# Patient Record
Sex: MALE | Race: WHITE | Employment: OTHER | URBAN - NONMETROPOLITAN AREA
[De-identification: names, ages, dates, MRNs, and addresses within clinical notes are randomized per-mention and may not be internally consistent; named-entity substitution may affect disease eponyms.]

---

## 2017-08-23 ENCOUNTER — APPOINTMENT (OUTPATIENT)
Dept: INTERVENTIONAL RADIOLOGY/VASCULAR | Age: 67
End: 2017-08-23
Payer: MEDICARE

## 2017-08-23 ENCOUNTER — APPOINTMENT (OUTPATIENT)
Dept: CT IMAGING | Age: 67
End: 2017-08-23
Payer: MEDICARE

## 2017-08-23 ENCOUNTER — HOSPITAL ENCOUNTER (EMERGENCY)
Age: 67
Discharge: HOME | End: 2017-08-24
Attending: EMERGENCY MEDICINE
Payer: MEDICARE

## 2017-08-23 DIAGNOSIS — M10.9 ACUTE GOUT OF LEFT KNEE, UNSPECIFIED CAUSE: Primary | ICD-10-CM

## 2017-08-23 LAB
ANION GAP SERPL CALCULATED.3IONS-SCNC: 19 MEQ/L (ref 8–16)
ANISOCYTOSIS: ABNORMAL
BACTERIA: ABNORMAL /HPF
BASOPHILS # BLD: 0.7 %
BASOPHILS ABSOLUTE: 0.1 THOU/MM3 (ref 0–0.1)
BILIRUBIN URINE: ABNORMAL
BLOOD, URINE: NEGATIVE
BUN BLDV-MCNC: 15 MG/DL (ref 7–22)
CALCIUM SERPL-MCNC: 9.7 MG/DL (ref 8.5–10.5)
CASTS 2: ABNORMAL /LPF
CASTS UA: ABNORMAL /LPF
CHARACTER, URINE: CLEAR
CHLORIDE BLD-SCNC: 94 MEQ/L (ref 98–111)
CO2: 24 MEQ/L (ref 23–33)
COLOR: ABNORMAL
CREAT SERPL-MCNC: 1.1 MG/DL (ref 0.4–1.2)
CRYSTALS, UA: ABNORMAL
D-DIMER QUANTITATIVE: 1735 NG/ML FEU (ref 0–500)
EOSINOPHIL # BLD: 0.1 %
EOSINOPHILS ABSOLUTE: 0 THOU/MM3 (ref 0–0.4)
EPITHELIAL CELLS, UA: ABNORMAL /HPF
GFR SERPL CREATININE-BSD FRML MDRD: 66 ML/MIN/1.73M2
GLUCOSE BLD-MCNC: 105 MG/DL (ref 70–108)
GLUCOSE URINE: NEGATIVE MG/DL
HCT VFR BLD CALC: 42.9 % (ref 42–52)
HEMOGLOBIN: 15.1 GM/DL (ref 14–18)
ICTOTEST: NEGATIVE
KETONES, URINE: 40
LEUKOCYTE ESTERASE, URINE: NEGATIVE
LYMPHOCYTES # BLD: 8.4 %
LYMPHOCYTES ABSOLUTE: 1.3 THOU/MM3 (ref 1–4.8)
MCH RBC QN AUTO: 31.3 PG (ref 27–31)
MCHC RBC AUTO-ENTMCNC: 35.3 GM/DL (ref 33–37)
MCV RBC AUTO: 88.8 FL (ref 80–94)
MISCELLANEOUS 2: ABNORMAL
MONOCYTES # BLD: 11 %
MONOCYTES ABSOLUTE: 1.7 THOU/MM3 (ref 0.4–1.3)
NITRITE, URINE: NEGATIVE
NUCLEATED RED BLOOD CELLS: 0 /100 WBC
OSMOLALITY CALCULATION: 275 MOSMOL/KG (ref 275–300)
PDW BLD-RTO: 14.8 % (ref 11.5–14.5)
PH UA: 5.5
PLATELET # BLD: 236 THOU/MM3 (ref 130–400)
PMV BLD AUTO: 8.2 MCM (ref 7.4–10.4)
POTASSIUM SERPL-SCNC: 3.9 MEQ/L (ref 3.5–5.2)
PRO-BNP: 127.3 PG/ML (ref 0–900)
PROTEIN UA: 30
RBC # BLD: 4.83 MILL/MM3 (ref 4.7–6.1)
RBC # BLD: NORMAL 10*6/UL
RBC URINE: ABNORMAL /HPF
RENAL EPITHELIAL, UA: ABNORMAL
SEG NEUTROPHILS: 79.8 %
SEGMENTED NEUTROPHILS ABSOLUTE COUNT: 12.5 THOU/MM3 (ref 1.8–7.7)
SODIUM BLD-SCNC: 137 MEQ/L (ref 135–145)
SPECIFIC GRAVITY, URINE: 1.02 (ref 1–1.03)
URIC ACID: 9.7 MG/DL (ref 3.7–7)
UROBILINOGEN, URINE: 1 EU/DL
WBC # BLD: 15.7 THOU/MM3 (ref 4.8–10.8)
WBC UA: ABNORMAL /HPF
YEAST: ABNORMAL

## 2017-08-23 PROCEDURE — 80048 BASIC METABOLIC PNL TOTAL CA: CPT

## 2017-08-23 PROCEDURE — 85379 FIBRIN DEGRADATION QUANT: CPT

## 2017-08-23 PROCEDURE — 85025 COMPLETE CBC W/AUTO DIFF WBC: CPT

## 2017-08-23 PROCEDURE — 84550 ASSAY OF BLOOD/URIC ACID: CPT

## 2017-08-23 PROCEDURE — 71275 CT ANGIOGRAPHY CHEST: CPT

## 2017-08-23 PROCEDURE — 81001 URINALYSIS AUTO W/SCOPE: CPT

## 2017-08-23 PROCEDURE — 83880 ASSAY OF NATRIURETIC PEPTIDE: CPT

## 2017-08-23 PROCEDURE — 96361 HYDRATE IV INFUSION ADD-ON: CPT

## 2017-08-23 PROCEDURE — 81003 URINALYSIS AUTO W/O SCOPE: CPT

## 2017-08-23 PROCEDURE — 99284 EMERGENCY DEPT VISIT MOD MDM: CPT

## 2017-08-23 PROCEDURE — 2580000003 HC RX 258: Performed by: EMERGENCY MEDICINE

## 2017-08-23 PROCEDURE — 96360 HYDRATION IV INFUSION INIT: CPT

## 2017-08-23 PROCEDURE — 36415 COLL VENOUS BLD VENIPUNCTURE: CPT

## 2017-08-23 PROCEDURE — 93970 EXTREMITY STUDY: CPT

## 2017-08-23 RX ORDER — 0.9 % SODIUM CHLORIDE 0.9 %
1000 INTRAVENOUS SOLUTION INTRAVENOUS ONCE
Status: COMPLETED | OUTPATIENT
Start: 2017-08-23 | End: 2017-08-24

## 2017-08-23 RX ORDER — COLCHICINE 0.6 MG/1
1.2 TABLET ORAL ONCE
Status: COMPLETED | OUTPATIENT
Start: 2017-08-24 | End: 2017-08-24

## 2017-08-23 RX ORDER — HYDROCODONE BITARTRATE AND ACETAMINOPHEN 5; 325 MG/1; MG/1
1 TABLET ORAL ONCE
Status: COMPLETED | OUTPATIENT
Start: 2017-08-24 | End: 2017-08-24

## 2017-08-23 RX ADMIN — SODIUM CHLORIDE 1000 ML: 9 INJECTION, SOLUTION INTRAVENOUS at 22:16

## 2017-08-23 ASSESSMENT — PAIN DESCRIPTION - DESCRIPTORS: DESCRIPTORS: ACHING

## 2017-08-23 ASSESSMENT — ENCOUNTER SYMPTOMS
CHEST TIGHTNESS: 0
SHORTNESS OF BREATH: 0
VOICE CHANGE: 0
EYE PAIN: 0
CONSTIPATION: 0
TROUBLE SWALLOWING: 0
ABDOMINAL DISTENTION: 0
DIARRHEA: 0
WHEEZING: 0
SORE THROAT: 0
EYE REDNESS: 0
COUGH: 0
RHINORRHEA: 0
EYE ITCHING: 0
BLOOD IN STOOL: 0
CHOKING: 0
PHOTOPHOBIA: 0
SINUS PRESSURE: 0
NAUSEA: 0
BACK PAIN: 0
EYE DISCHARGE: 0
ABDOMINAL PAIN: 0
VOMITING: 0

## 2017-08-23 ASSESSMENT — PAIN DESCRIPTION - PAIN TYPE
TYPE: ACUTE PAIN
TYPE: ACUTE PAIN

## 2017-08-23 ASSESSMENT — PAIN DESCRIPTION - ORIENTATION
ORIENTATION: LEFT

## 2017-08-23 ASSESSMENT — PAIN DESCRIPTION - LOCATION
LOCATION: KNEE
LOCATION: KNEE;LEG
LOCATION: KNEE;LEG

## 2017-08-23 ASSESSMENT — PAIN SCALES - GENERAL
PAINLEVEL_OUTOF10: 4
PAINLEVEL_OUTOF10: 3
PAINLEVEL_OUTOF10: 8

## 2017-08-23 ASSESSMENT — PAIN DESCRIPTION - ONSET: ONSET: ON-GOING

## 2017-08-24 VITALS
TEMPERATURE: 98.7 F | BODY MASS INDEX: 33.5 KG/M2 | HEART RATE: 88 BPM | HEIGHT: 70 IN | SYSTOLIC BLOOD PRESSURE: 118 MMHG | RESPIRATION RATE: 18 BRPM | WEIGHT: 234 LBS | OXYGEN SATURATION: 95 % | DIASTOLIC BLOOD PRESSURE: 61 MMHG

## 2017-08-24 PROCEDURE — 6370000000 HC RX 637 (ALT 250 FOR IP): Performed by: EMERGENCY MEDICINE

## 2017-08-24 PROCEDURE — 6360000004 HC RX CONTRAST MEDICATION: Performed by: EMERGENCY MEDICINE

## 2017-08-24 RX ORDER — HYDROCODONE BITARTRATE AND ACETAMINOPHEN 5; 325 MG/1; MG/1
1 TABLET ORAL EVERY 6 HOURS PRN
Qty: 20 TABLET | Refills: 0 | Status: SHIPPED | OUTPATIENT
Start: 2017-08-24 | End: 2021-12-29

## 2017-08-24 RX ORDER — COLCHICINE 0.6 MG/1
0.6 TABLET ORAL DAILY
Qty: 5 TABLET | Refills: 0 | Status: SHIPPED | OUTPATIENT
Start: 2017-08-24 | End: 2021-12-29

## 2017-08-24 RX ADMIN — COLCHICINE 1.2 MG: 0.6 TABLET, FILM COATED ORAL at 01:37

## 2017-08-24 RX ADMIN — HYDROCODONE BITARTRATE AND ACETAMINOPHEN 1 TABLET: 5; 325 TABLET ORAL at 00:45

## 2017-08-24 RX ADMIN — IOPAMIDOL 80 ML: 755 INJECTION, SOLUTION INTRAVENOUS at 00:03

## 2017-08-24 ASSESSMENT — PAIN DESCRIPTION - LOCATION: LOCATION: KNEE;LEG

## 2017-08-24 ASSESSMENT — PAIN SCALES - GENERAL
PAINLEVEL_OUTOF10: 3
PAINLEVEL_OUTOF10: 2

## 2017-08-24 ASSESSMENT — PAIN DESCRIPTION - PAIN TYPE
TYPE: ACUTE PAIN
TYPE: ACUTE PAIN

## 2017-08-24 ASSESSMENT — PAIN DESCRIPTION - DESCRIPTORS: DESCRIPTORS: ACHING

## 2017-08-24 ASSESSMENT — PAIN DESCRIPTION - ORIENTATION: ORIENTATION: LEFT

## 2021-12-29 ENCOUNTER — APPOINTMENT (OUTPATIENT)
Dept: INTERVENTIONAL RADIOLOGY/VASCULAR | Age: 71
End: 2021-12-29
Payer: MEDICARE

## 2021-12-29 ENCOUNTER — APPOINTMENT (OUTPATIENT)
Dept: GENERAL RADIOLOGY | Age: 71
End: 2021-12-29
Payer: MEDICARE

## 2021-12-29 ENCOUNTER — HOSPITAL ENCOUNTER (EMERGENCY)
Age: 71
Discharge: HOME | End: 2021-12-29
Payer: MEDICARE

## 2021-12-29 VITALS
SYSTOLIC BLOOD PRESSURE: 143 MMHG | RESPIRATION RATE: 16 BRPM | OXYGEN SATURATION: 100 % | DIASTOLIC BLOOD PRESSURE: 77 MMHG | HEART RATE: 85 BPM | TEMPERATURE: 98.2 F

## 2021-12-29 DIAGNOSIS — M54.50 CHRONIC LOW BACK PAIN WITHOUT SCIATICA, UNSPECIFIED BACK PAIN LATERALITY: ICD-10-CM

## 2021-12-29 DIAGNOSIS — M10.9 GOUTY ARTHRITIS: Primary | ICD-10-CM

## 2021-12-29 DIAGNOSIS — G89.29 CHRONIC LOW BACK PAIN WITHOUT SCIATICA, UNSPECIFIED BACK PAIN LATERALITY: ICD-10-CM

## 2021-12-29 LAB
ALBUMIN SERPL-MCNC: 3.6 G/DL (ref 3.5–5.1)
ALP BLD-CCNC: 107 U/L (ref 38–126)
ALT SERPL-CCNC: 52 U/L (ref 11–66)
ANION GAP SERPL CALCULATED.3IONS-SCNC: 15 MEQ/L (ref 8–16)
AST SERPL-CCNC: 69 U/L (ref 5–40)
BASOPHILS # BLD: 0.7 %
BASOPHILS ABSOLUTE: 0.1 THOU/MM3 (ref 0–0.1)
BILIRUB SERPL-MCNC: 0.7 MG/DL (ref 0.3–1.2)
BUN BLDV-MCNC: 36 MG/DL (ref 7–22)
CALCIUM SERPL-MCNC: 9.4 MG/DL (ref 8.5–10.5)
CHLORIDE BLD-SCNC: 98 MEQ/L (ref 98–111)
CO2: 25 MEQ/L (ref 23–33)
CREAT SERPL-MCNC: 1.2 MG/DL (ref 0.4–1.2)
EOSINOPHIL # BLD: 0.5 %
EOSINOPHILS ABSOLUTE: 0.1 THOU/MM3 (ref 0–0.4)
ERYTHROCYTE [DISTWIDTH] IN BLOOD BY AUTOMATED COUNT: 12.4 % (ref 11.5–14.5)
ERYTHROCYTE [DISTWIDTH] IN BLOOD BY AUTOMATED COUNT: 41.1 FL (ref 35–45)
GFR SERPL CREATININE-BSD FRML MDRD: 59 ML/MIN/1.73M2
GLUCOSE BLD-MCNC: 102 MG/DL (ref 70–108)
HCT VFR BLD CALC: 43.3 % (ref 42–52)
HEMOGLOBIN: 14.9 GM/DL (ref 14–18)
IMMATURE GRANS (ABS): 0.18 THOU/MM3 (ref 0–0.07)
IMMATURE GRANULOCYTES: 1.7 %
LACTIC ACID, SEPSIS: 0.9 MMOL/L (ref 0.5–1.9)
LYMPHOCYTES # BLD: 9 %
LYMPHOCYTES ABSOLUTE: 1 THOU/MM3 (ref 1–4.8)
MCH RBC QN AUTO: 31.3 PG (ref 26–33)
MCHC RBC AUTO-ENTMCNC: 34.4 GM/DL (ref 32.2–35.5)
MCV RBC AUTO: 91 FL (ref 80–94)
MONOCYTES # BLD: 10.9 %
MONOCYTES ABSOLUTE: 1.2 THOU/MM3 (ref 0.4–1.3)
NUCLEATED RED BLOOD CELLS: 0 /100 WBC
OSMOLALITY CALCULATION: 284.2 MOSMOL/KG (ref 275–300)
PLATELET # BLD: 383 THOU/MM3 (ref 130–400)
PMV BLD AUTO: 10 FL (ref 9.4–12.4)
POTASSIUM REFLEX MAGNESIUM: 4 MEQ/L (ref 3.5–5.2)
PROCALCITONIN: 0.13 NG/ML (ref 0.01–0.09)
RBC # BLD: 4.76 MILL/MM3 (ref 4.7–6.1)
SEG NEUTROPHILS: 77.2 %
SEGMENTED NEUTROPHILS ABSOLUTE COUNT: 8.3 THOU/MM3 (ref 1.8–7.7)
SODIUM BLD-SCNC: 138 MEQ/L (ref 135–145)
TOTAL PROTEIN: 7.4 G/DL (ref 6.1–8)
URIC ACID: 10.1 MG/DL (ref 3.7–7)
WBC # BLD: 10.8 THOU/MM3 (ref 4.8–10.8)

## 2021-12-29 PROCEDURE — 6370000000 HC RX 637 (ALT 250 FOR IP): Performed by: NURSE PRACTITIONER

## 2021-12-29 PROCEDURE — 84550 ASSAY OF BLOOD/URIC ACID: CPT

## 2021-12-29 PROCEDURE — 99283 EMERGENCY DEPT VISIT LOW MDM: CPT

## 2021-12-29 PROCEDURE — 84145 PROCALCITONIN (PCT): CPT

## 2021-12-29 PROCEDURE — 72100 X-RAY EXAM L-S SPINE 2/3 VWS: CPT

## 2021-12-29 PROCEDURE — 96372 THER/PROPH/DIAG INJ SC/IM: CPT

## 2021-12-29 PROCEDURE — 36415 COLL VENOUS BLD VENIPUNCTURE: CPT

## 2021-12-29 PROCEDURE — 83605 ASSAY OF LACTIC ACID: CPT

## 2021-12-29 PROCEDURE — 73630 X-RAY EXAM OF FOOT: CPT

## 2021-12-29 PROCEDURE — 80053 COMPREHEN METABOLIC PANEL: CPT

## 2021-12-29 PROCEDURE — 93970 EXTREMITY STUDY: CPT

## 2021-12-29 PROCEDURE — 6360000002 HC RX W HCPCS: Performed by: NURSE PRACTITIONER

## 2021-12-29 PROCEDURE — 85025 COMPLETE CBC W/AUTO DIFF WBC: CPT

## 2021-12-29 RX ORDER — COLCHICINE 0.6 MG/1
1.2 TABLET ORAL ONCE
Status: COMPLETED | OUTPATIENT
Start: 2021-12-29 | End: 2021-12-29

## 2021-12-29 RX ORDER — COLCHICINE 0.6 MG/1
0.6 TABLET ORAL ONCE
Status: COMPLETED | OUTPATIENT
Start: 2021-12-29 | End: 2021-12-29

## 2021-12-29 RX ORDER — METHYLPREDNISOLONE ACETATE 80 MG/ML
80 INJECTION, SUSPENSION INTRA-ARTICULAR; INTRALESIONAL; INTRAMUSCULAR; SOFT TISSUE ONCE
Status: COMPLETED | OUTPATIENT
Start: 2021-12-29 | End: 2021-12-29

## 2021-12-29 RX ORDER — COLCHICINE 0.6 MG/1
TABLET ORAL
Qty: 15 TABLET | Refills: 0 | Status: SHIPPED | OUTPATIENT
Start: 2021-12-29 | End: 2022-10-19 | Stop reason: ALTCHOICE

## 2021-12-29 RX ORDER — PREDNISONE 20 MG/1
20 TABLET ORAL 2 TIMES DAILY
Qty: 10 TABLET | Refills: 0 | Status: SHIPPED | OUTPATIENT
Start: 2021-12-29 | End: 2022-01-03

## 2021-12-29 RX ADMIN — METHYLPREDNISOLONE ACETATE 80 MG: 80 INJECTION, SUSPENSION INTRA-ARTICULAR; INTRALESIONAL; INTRAMUSCULAR; SOFT TISSUE at 19:40

## 2021-12-29 RX ADMIN — COLCHICINE 1.2 MG: 0.6 TABLET, FILM COATED ORAL at 19:40

## 2021-12-29 RX ADMIN — COLCHICINE 0.6 MG: 0.6 TABLET, FILM COATED ORAL at 19:41

## 2021-12-29 ASSESSMENT — PAIN DESCRIPTION - LOCATION: LOCATION: FOOT

## 2021-12-29 ASSESSMENT — PAIN SCALES - GENERAL
PAINLEVEL_OUTOF10: 6
PAINLEVEL_OUTOF10: 6

## 2021-12-29 NOTE — ED NOTES
Bed: 033A  Expected date:   Expected time:   Means of arrival: [address]  Comments:     Everett Castrejon RN  12/29/21 9250

## 2021-12-29 NOTE — ED TRIAGE NOTES
Pt presents via ems with c/o generalized weakness and foot pain. Squad reports they were called for a wellness check. The patient reports general weakness and pain in bilat feet worse on L. He has hx of gout.

## 2021-12-30 NOTE — ED PROVIDER NOTES
[address]     Pt Name: Ron Elizabeth  MRN: [id number]  [place] 1950  Date of evaluation: 12/29/21        Mid-level provider Note:    I have personally performed and/or participated in the history, exam and medical decision making and agree with all pertinent clinical information as noted by the previous provider. I have also reviewed and agree with the past medical, family and social history unless otherwise noted. I have personally performed a face to face diagnostic evaluation on this patient. I have reviewed the previous provider's findings and agree. Evaluation: I assumed care of this patient from Claudell Brakeman, [place], pending imaging results. The patient has an acute gout flare up. He also is not established with a pcp and has not had routine care. I discussed getting him into the Residency clinic and this is scheduled for the patient. He is agreeable. Will give patient 1.2 mg colchicine here and then give a dose to repeat in 1 hour. DC home with one dose daily. Additionally, will give steroids. Follow up as directed. Return for new or worsening symptoms. XR LUMBAR SPINE (2-3 VIEWS)    Result Date: 12/29/2021  LUMBAR SPINE 2 VIEWS: CLINICAL INFORMATION: pain COMPARISON: No prior study. TECHNIQUE: Standard AP and lateral views of lumbar spine were obtained. 1. Mild thoracolumbar dextro scoliosis. Cannot exclude muscle spasm left side. 2. Moderate disc space narrowing L3-4 and L5/S1. Mild disc space narrowing L4-5. Antegrade spondylolisthesis of L4 upon L5, 5 mm. Mild retrolisthesis of L5 upon S1, 4 mm. 3. Mild vertebral body spondylosis scattered in the lumbar spine. Moderate facet arthropathy L4-5 bilaterally. No fracture seen. Sacroiliac joints unremarkable. **This report has been created using voice recognition software. It may contain minor errors which are inherent in voice recognition technology. ** Final report electronically signed by Dr. Mery Tinoco on 12/29/2021 6:09 PM    XR FOOT LEFT (MIN 3 VIEWS)    Result Date: 12/29/2021  PROCEDURE: XR FOOT LEFT (MIN 3 VIEWS) CLINICAL INFORMATION: Left foot pain and swelling. History of gout. COMPARISON: No prior study. TECHNIQUE: AP, lateral, and 2 oblique views of the foot performed. FINDINGS: First MTP joint space narrowing, marginal spurring, and small erosions are present. Joint space narrowing and marginal spurring is also noted at the base of the first metatarsal where there are small erosions. Small erosions are also noted at the interphalangeal joint of the great toe in the distal interphalangeal joints of the fourth and fifth toes. Joint space narrowing and marginal spurring is present in the midfoot. A plantar calcaneal spur measures 8 mm. A dorsal calcaneal spur measures 11 mm. Generalized soft tissue swelling is most pronounced along the dorsal aspect of the foot. Moderate chronic arthritic changes are most pronounced at the first MTP joint where there is marginal spurring and small erosions consistent with the clinical history of inflammatory arthropathy. Generalized soft tissue swelling is most pronounced along the dorsal aspect of the foot. Additional chronic arthritic changes are discussed. No fracture or acute bony abnormality is observed. **This report has been created using voice recognition software. It may contain minor errors which are inherent in voice recognition technology. ** Final report electronically signed by Dr Ramón Shepherd on 12/29/2021 4:52 PM    VL DUP LOWER EXTREMITY VENOUS BILATERAL    Result Date: 12/29/2021  PROCEDURE: VL DUP LOWER EXTREMITY VENOUS BILATERAL CLINICAL INFORMATION: swelling, pain COMPARISON: No prior study. TECHNIQUE: Multiple grayscale and color flow images of the major veins of both lower extremities were obtained from the level of the groin to the level of the ankle.  Multiple compression and augmentation maneuvers were performed and spectral Doppler waveforms were generated. .. FINDINGS: RIGHT LOWER EXTREMITY:All the deep veins of the right lower extremity are widely patent with normal flow and normal compressibility. LEFT LOWER EXTREMITY:All the  deep veins of the left lower extremity are widely patent with normal flow and normal compressibility. Normal venous ultrasound. No evidence for deep venous thrombosis. **This report has been created using voice recognition software. It may contain minor errors which are inherent in voice recognition technology. ** Final report electronically signed by Dr. Gary Douglas on 12/29/2021 5:56 PM        DIAGNOSIS  1. Gouty arthritis    2. Chronic low back pain without sciatica, unspecified back pain laterality           DISPOSITION/PLAN  PATIENT REFERRED TO:  [facility] INTERNAL MEDICINE SPEC 11  [address] [place], [place] [id number] [phone]  Go on 1/3/2022  @ 3 pm, For follow up    DISCHARGE MEDICATIONS:  New Prescriptions    COLCHICINE (COLCRYS) 0.6 MG TABLET    Take one tab daily until gout flare up resolves.     PREDNISONE (DELTASONE) 20 MG TABLET    Take 1 tablet by mouth 2 times daily for 5 days         ISAIAH Friend CNP, APRN - CNP  12/29/21 1929

## 2021-12-31 NOTE — ED PROVIDER NOTES
[address] [facility] [address] [address] EMERGENCY DEPT    EMERGENCY MEDICINE     Pt Name: Roberto Whitaker  MRN: [id number]  [place] 1950  Date of evaluation: 12/29/2021  Provider: Robb Terry PA-C    CHIEF COMPLAINT       Chief Complaint   Patient presents with    Extremity Weakness    Foot Pain     Left       HISTORY OF PRESENT ILLNESS    Roberto Whitaker is a pleasant 66 y.o. male who presents to the emergency department for right ankle pain and swelling. Patient states for the past month he has had fluctuating right ankle pain and swelling. He states the left lower leg has had fluctuating swelling as well. He states the pain to the right ankle is 8/10 dull ache that is nonradiating. He has history of gout flareups for years and is currently not taking gout medicine due to running out of it and not following up with his PCP. He states he has always had issues with ambulation but has worsened in the past month secondary to the ankle pain. No complaints of chest pain, shortness of breath, fevers, chills. Triage notes and Nursing notes were reviewed by myself. Any discrepancies are addressed above. PAST MEDICAL HISTORY     Past Medical History:   Diagnosis Date    Gout     Hypertension     Kidney stone     Pneumothorax on right        SURGICAL HISTORY       Past Surgical History:   Procedure Laterality Date    KIDNEY SURGERY      SHOULDER SURGERY Left        CURRENT MEDICATIONS       Discharge Medication List as of 12/29/2021  7:25 PM          ALLERGIES     Patient has no known allergies. FAMILY HISTORY     No family history on file.      SOCIAL HISTORY       Social History     Socioeconomic History    Marital status:      Spouse name: Not on file    Number of children: Not on file    Years of education: Not on file    Highest education level: Not on file   Occupational History    Not on file   Tobacco Use    Smoking status: Former Smoker    Smokeless tobacco: Never Used   Substance and Sexual Activity    Alcohol use: No    Drug use: No    Sexual activity: Not on file   Other Topics Concern    Not on file   Social History Narrative    Not on file     Social Determinants of Health     Financial Resource Strain:     Difficulty of Paying Living Expenses: Not on file   Food Insecurity:     Worried About Running Out of Food in the Last Year: Not on file    Miles of Food in the Last Year: Not on file   Transportation Needs:     Lack of Transportation (Medical): Not on file    Lack of Transportation (Non-Medical): Not on file   Physical Activity:     Days of Exercise per Week: Not on file    Minutes of Exercise per Session: Not on file   Stress:     Feeling of Stress : Not on file   Social Connections:     Frequency of Communication with Friends and Family: Not on file    Frequency of Social Gatherings with Friends and Family: Not on file    Attends Shinto Services: Not on file    Active Member of [address] [place] [address] or Organizations: Not on file    Attends Club or Organization Meetings: Not on file    Marital Status: Not on file   Intimate Partner Violence:     Fear of Current or Ex-Partner: Not on file    Emotionally Abused: Not on file    Physically Abused: Not on file    Sexually Abused: Not on file   Housing Stability:     Unable to Pay for Housing in the Last Year: Not on file    Number of [place] in the Last Year: Not on file    Unstable Housing in the Last Year: Not on file       REVIEW OF SYSTEMS     Review of Systems   Constitutional: Negative for chills and fever. HENT: Negative for congestion, ear pain and sore throat. Eyes: Negative. Respiratory: Negative for cough. Cardiovascular: Negative for chest pain and palpitations. Gastrointestinal: Negative for diarrhea, nausea and vomiting. Genitourinary: Negative for dysuria and urgency. Musculoskeletal: Positive for arthralgias, gait problem, joint swelling and myalgias.  Negative for back pain, neck pain and neck stiffness. Skin: Negative. Negative for rash. Neurological: Negative for headaches. Psychiatric/Behavioral: Negative. All other systems reviewed and are negative. Except as noted above the remainder of the review of systems was reviewed and is. PHYSICAL EXAM     INITIAL VITALS:  temperature is 98.2 °F (36.8 °C). His blood pressure is 143/77 (abnormal) and his pulse is 85. His respiration is 16 and oxygen saturation is 100%. Physical Exam  Vitals and nursing note reviewed. Exam conducted with a chaperone present. Constitutional:       General: He is not in acute distress. Appearance: Normal appearance. He is normal weight. He is not ill-appearing, toxic-appearing or diaphoretic. HENT:      Head: Normocephalic and atraumatic. Right Ear: External ear normal.      Left Ear: External ear normal.      Nose: Nose normal.      Mouth/Throat:      Mouth: Mucous membranes are moist.   Eyes:      Pupils: Pupils are equal, round, and reactive to light. Cardiovascular:      Rate and Rhythm: Normal rate and regular rhythm. Pulses: Normal pulses. Heart sounds: Normal heart sounds. Pulmonary:      Effort: Pulmonary effort is normal. No respiratory distress. Breath sounds: Normal breath sounds. Abdominal:      General: Bowel sounds are normal. There is no distension. Palpations: Abdomen is soft. Tenderness: There is no abdominal tenderness. There is no right CVA tenderness, left CVA tenderness or guarding. Musculoskeletal:      Cervical back: Normal, normal range of motion and neck supple. Thoracic back: Normal.      Lumbar back: Tenderness present. No swelling, edema, deformity, signs of trauma, lacerations, spasms or bony tenderness. Decreased range of motion. Negative right straight leg raise test and negative left straight leg raise test. No scoliosis. Right lower leg: Tenderness present. No deformity, lacerations or bony tenderness.  1+ Edema present. Left lower leg: No deformity, lacerations, tenderness or bony tenderness. 1+ Edema present. Left ankle: Normal.      Comments: Right lower extremity demonstrates mild edema to the lower leg and ankle with noted ankle medial erythema and warmth to touch. Tender to palpation along the medial ankle joint. Positive Homans. 5/5 strength EHL, dorsiflexion, plantarflexion. Sensation intact distally light touch of the foot. 2+ palpable dorsalis pedis posterior tibial pulse. Left lower extremity has mild edema to the lower leg with no erythema or signs of infection. Neurovascularly intact. Positive Homans. Lymphadenopathy:      Cervical: No cervical adenopathy. Skin:     General: Skin is warm and dry. Neurological:      Mental Status: He is alert and oriented to person, place, and time. Psychiatric:         Mood and Affect: Mood normal.         Behavior: Behavior normal.         Thought Content: Thought content normal.         DIFFERENTIAL DIAGNOSIS:   Differential diagnoses are discussed    DIAGNOSTIC RESULTS     EKG: All EKG's are interpreted by the Emergency Department Physician who either signs or Co-signsthis chart in the absence of a cardiologist.    None      RADIOLOGY: non-plain film images(s) such as CT, Ultrasound and MRI are read by the radiologist.    XR LUMBAR SPINE (2-3 VIEWS)   Final Result   1. Mild thoracolumbar dextro scoliosis. Cannot exclude muscle spasm left side. 2. Moderate disc space narrowing L3-4 and L5/S1. Mild disc space narrowing L4-5. Antegrade spondylolisthesis of L4 upon L5, 5 mm. Mild retrolisthesis of L5 upon S1, 4 mm. 3. Mild vertebral body spondylosis scattered in the lumbar spine. Moderate facet arthropathy L4-5 bilaterally. No fracture seen. Sacroiliac joints unremarkable. **This report has been created using voice recognition software. It may contain minor errors which are inherent in voice recognition technology. **      Final report electronically signed by Dr. Jen Doe on 12/29/2021 6:09 PM      VL DUP LOWER EXTREMITY VENOUS BILATERAL   Final Result   Normal venous ultrasound. No evidence for deep venous thrombosis. **This report has been created using voice recognition software. It may contain minor errors which are inherent in voice recognition technology. **      Final report electronically signed by Dr. Jen Doe on 12/29/2021 5:56 PM      XR FOOT LEFT (MIN 3 VIEWS)   Final Result      Moderate chronic arthritic changes are most pronounced at the first MTP joint where there is marginal spurring and small erosions consistent with the clinical history of inflammatory arthropathy. Generalized soft tissue swelling is most pronounced along    the dorsal aspect of the foot. Additional chronic arthritic changes are discussed. No fracture or acute bony abnormality is observed. **This report has been created using voice recognition software. It may contain minor errors which are inherent in voice recognition technology. **      Final report electronically signed by Dr Jeff Jean on 12/29/2021 4:52 PM          LABS:   Labs Reviewed   CBC WITH AUTO DIFFERENTIAL - Abnormal; Notable for the following components:       Result Value    Segs Absolute 8.3 (*)     Immature Grans (Abs) 0.18 (*)     All other components within normal limits   COMPREHENSIVE METABOLIC PANEL W/ REFLEX TO MG FOR LOW K - Abnormal; Notable for the following components:    BUN 36 (*)     AST 69 (*)     All other components within normal limits   GLOMERULAR FILTRATION RATE, ESTIMATED - Abnormal; Notable for the following components:    Est, Glom Filt Rate 59 (*)     All other components within normal limits   URIC ACID - Abnormal; Notable for the following components:    Uric Acid 10.1 (*)     All other components within normal limits   PROCALCITONIN - Abnormal; Notable for the following components:    Procalcitonin 0.13 (*)     All other components within normal limits   ANION GAP   OSMOLALITY   LACTATE, SEPSIS       EMERGENCY DEPARTMENT COURSE:   Vitals:    Vitals:    12/29/21 1524   BP: (!) 143/77   Pulse: 85   Resp: 16   Temp: 98.2 °F (36.8 °C)   SpO2: 100%     4:05 AM EST: The patient was seen and evaluated. MDM:  Patient is 64-year-old male with fluctuating right ankle pain and swelling with noted bilateral lower extremity swelling. Patient has history of gout. He has no complaints of fevers or chills. Has off-and-on complaints of lower back pain. At this point x-rays were taken of lumbar spine and left foot. No acute or emergent findings noted on x-rays. Recommend venous duplex bilateral lower extremities to rule out DVT. Ultrasound came back negative for DVTs. Blood work of CBC, CMP, uric acid, lactate, procalcitonin were recommended to rule out infection and gout. Blood work came back with no emergent findings but uric acid was elevated to 10.1. At this point my shift is ended in transfer care was given to GERONIMO CARVER NP.    CRITICAL CARE:   None    CONSULTS:  None    PROCEDURES:  None    FINAL IMPRESSION      1. Gouty arthritis    2.  Chronic low back pain without sciatica, unspecified back pain laterality          DISPOSITION/PLAN   Discharged home    PATIENT REFERRED TO:  [facility] INTERNAL MEDICINE SPEC [address] [address]  Go on 1/3/2022  @ 3 pm, For follow up      [address] [place] [address]:  Discharge Medication List as of 12/29/2021  7:25 PM      START taking these medications    Details   predniSONE (DELTASONE) 20 MG tablet Take 1 tablet by mouth 2 times daily for 5 days, Disp-10 tablet, R-0Print                 (Please note that portions of this note were completed with a voice recognition program.  Efforts were made to edit the dictations but occasionallywords are mis-transcribed.)      Renetta Steinberg PA-C(electronically signed)  Physician Associate, Emergency Department       Vanna Maldonado [id number] [facility]  12/31/21 3159

## 2022-01-06 ENCOUNTER — TELEPHONE (OUTPATIENT)
Dept: CARDIOLOGY CLINIC | Age: 72
End: 2022-01-06

## 2022-01-06 ENCOUNTER — TELEPHONE (OUTPATIENT)
Dept: FAMILY MEDICINE CLINIC | Age: 72
End: 2022-01-06

## 2022-01-06 ENCOUNTER — HOSPITAL ENCOUNTER (OUTPATIENT)
Age: 72
Discharge: HOME | End: 2022-01-06
Payer: MEDICARE

## 2022-01-06 ENCOUNTER — OFFICE VISIT (OUTPATIENT)
Dept: FAMILY MEDICINE CLINIC | Age: 72
End: 2022-01-06
Payer: MEDICARE

## 2022-01-06 ENCOUNTER — NURSE ONLY (OUTPATIENT)
Dept: LAB | Age: 72
End: 2022-01-06

## 2022-01-06 VITALS
OXYGEN SATURATION: 98 % | DIASTOLIC BLOOD PRESSURE: 68 MMHG | HEIGHT: 70 IN | BODY MASS INDEX: 28.77 KG/M2 | SYSTOLIC BLOOD PRESSURE: 118 MMHG | WEIGHT: 201 LBS | HEART RATE: 61 BPM

## 2022-01-06 DIAGNOSIS — R07.89 INTERMITTENT LEFT-SIDED CHEST PAIN: ICD-10-CM

## 2022-01-06 DIAGNOSIS — R94.31 ABNORMAL EKG: Primary | ICD-10-CM

## 2022-01-06 DIAGNOSIS — R60.0 EDEMA OF BOTH LOWER LEGS: ICD-10-CM

## 2022-01-06 DIAGNOSIS — R94.4 DECREASED GFR: ICD-10-CM

## 2022-01-06 DIAGNOSIS — R60.0 EDEMA OF BOTH LOWER LEGS: Primary | ICD-10-CM

## 2022-01-06 DIAGNOSIS — M1A.00X0 CHRONIC GOUTY ARTHRITIS: ICD-10-CM

## 2022-01-06 LAB
ALBUMIN SERPL-MCNC: 4.4 G/DL (ref 3.5–5.1)
ALP BLD-CCNC: 105 U/L (ref 38–126)
ALT SERPL-CCNC: 27 U/L (ref 11–66)
ANION GAP SERPL CALCULATED.3IONS-SCNC: 12 MEQ/L (ref 8–16)
AST SERPL-CCNC: 20 U/L (ref 5–40)
BILIRUB SERPL-MCNC: 0.7 MG/DL (ref 0.3–1.2)
BUN BLDV-MCNC: 21 MG/DL (ref 7–22)
CALCIUM SERPL-MCNC: 9.6 MG/DL (ref 8.5–10.5)
CHLORIDE BLD-SCNC: 100 MEQ/L (ref 98–111)
CO2: 30 MEQ/L (ref 23–33)
CREAT SERPL-MCNC: 1 MG/DL (ref 0.4–1.2)
GFR SERPL CREATININE-BSD FRML MDRD: 73 ML/MIN/1.73M2
GLUCOSE BLD-MCNC: 89 MG/DL (ref 70–108)
POTASSIUM SERPL-SCNC: 4.6 MEQ/L (ref 3.5–5.2)
SODIUM BLD-SCNC: 142 MEQ/L (ref 135–145)
TOTAL PROTEIN: 6.9 G/DL (ref 6.1–8)

## 2022-01-06 PROCEDURE — 93005 ELECTROCARDIOGRAM TRACING: CPT

## 2022-01-06 PROCEDURE — G8484 FLU IMMUNIZE NO ADMIN: HCPCS | Performed by: NURSE PRACTITIONER

## 2022-01-06 PROCEDURE — 1036F TOBACCO NON-USER: CPT | Performed by: NURSE PRACTITIONER

## 2022-01-06 PROCEDURE — G8417 CALC BMI ABV UP PARAM F/U: HCPCS | Performed by: NURSE PRACTITIONER

## 2022-01-06 PROCEDURE — 4040F PNEUMOC VAC/ADMIN/RCVD: CPT | Performed by: NURSE PRACTITIONER

## 2022-01-06 PROCEDURE — 3017F COLORECTAL CA SCREEN DOC REV: CPT | Performed by: NURSE PRACTITIONER

## 2022-01-06 PROCEDURE — 1123F ACP DISCUSS/DSCN MKR DOCD: CPT | Performed by: NURSE PRACTITIONER

## 2022-01-06 PROCEDURE — G8427 DOCREV CUR MEDS BY ELIG CLIN: HCPCS | Performed by: NURSE PRACTITIONER

## 2022-01-06 PROCEDURE — 99205 OFFICE O/P NEW HI 60 MIN: CPT | Performed by: NURSE PRACTITIONER

## 2022-01-06 RX ORDER — ALLOPURINOL 100 MG/1
50 TABLET ORAL DAILY
Qty: 15 TABLET | Refills: 2 | Status: SHIPPED | OUTPATIENT
Start: 2022-01-06 | End: 2022-04-06 | Stop reason: SDUPTHER

## 2022-01-06 SDOH — ECONOMIC STABILITY: FOOD INSECURITY: WITHIN THE PAST 12 MONTHS, THE FOOD YOU BOUGHT JUST DIDN'T LAST AND YOU DIDN'T HAVE MONEY TO GET MORE.: NEVER TRUE

## 2022-01-06 SDOH — ECONOMIC STABILITY: FOOD INSECURITY: WITHIN THE PAST 12 MONTHS, YOU WORRIED THAT YOUR FOOD WOULD RUN OUT BEFORE YOU GOT MONEY TO BUY MORE.: NEVER TRUE

## 2022-01-06 ASSESSMENT — SOCIAL DETERMINANTS OF HEALTH (SDOH): HOW HARD IS IT FOR YOU TO PAY FOR THE VERY BASICS LIKE FOOD, HOUSING, MEDICAL CARE, AND HEATING?: NOT HARD AT ALL

## 2022-01-06 ASSESSMENT — PATIENT HEALTH QUESTIONNAIRE - PHQ9
SUM OF ALL RESPONSES TO PHQ QUESTIONS 1-9: 0
2. FEELING DOWN, DEPRESSED OR HOPELESS: 0
SUM OF ALL RESPONSES TO PHQ9 QUESTIONS 1 & 2: 0
1. LITTLE INTEREST OR PLEASURE IN DOING THINGS: 0

## 2022-01-06 NOTE — TELEPHONE ENCOUNTER
Notified patient in person. States understanding. Would like to be scheduled with a provider that comes to [facility]. Explained to patient scheduling will call him and to let them know.

## 2022-01-06 NOTE — PATIENT INSTRUCTIONS
Start Allopurinol 100 mg, take 1/2 tablet daily  Low sodium diet  Stay hydrated  Compression socks, elevate legs when sitting  Repeat lab work in 1 month to evaluate kidney function  EKG and echocardiogram due to intermittent chest pain and swelling of lower legs. Low Back Arthritis: Exercises  Introduction  Here are some examples of typical rehabilitation exercises for your condition. Start each exercise slowly. Ease off the exercise if you start to have pain. Your doctor or physical therapist will tell you when you can start these exercises and which ones will work best for you. When you are not being active, find a comfortable position for rest. Some people are comfortable on the floor or a medium-firm bed with a small pillow under their head and another under their knees. Some people prefer to lie on their side with a pillow between their knees. Don't stay in one position for too long. Take short walks (10 to 20 minutes) every 2 to 3 hours. Avoid slopes, hills, and stairs until you feel better. Walk only distances you can manage without pain, especially leg pain. How to do the exercises  Pelvic tilt   1. Lie on your back with your knees bent. 2. \"Brace\" your stomachtighten your muscles by pulling in and imagining your belly button moving toward your spine. 3. Press your lower back into the floor. You should feel your hips and pelvis rock back. 4. Hold for 6 seconds while breathing smoothly. 5. Relax and allow your pelvis and hips to rock forward. 6. Repeat 8 to 12 times. Back stretches   1. Get down on your hands and knees on the floor. 2. Relax your head and allow it to droop. Round your back up toward the ceiling until you feel a nice stretch in your upper, middle, and lower back. Hold this stretch for as long as it feels comfortable, or about 15 to 30 seconds. 3. Return to the starting position with a flat back while you are on your hands and knees.   4. Let your back sway by pressing your stomach toward the floor. Lift your buttocks toward the ceiling. 5. Hold this position for 15 to 30 seconds. 6. Repeat 2 to 4 times. Therapeutic Ball: Back Exercises  Introduction  Here are some examples of typical exercises for your condition. Start each exercise slowly. Ease off the exercise if you start to have pain. Your doctor or physical therapist will tell you when you can start these exercises and which ones will work best for you. To prepare, make sure that your ball is the right size for you. When inflated and firm, it should allow you to sit with your hips and knees bent at about a 90-degree angle (like the letter L). How to do the exercises      Seated position on ball   1. Use this exercise to get used to moving on the ball and to find your best sitting position. 2. Sit comfortably on the ball with your feet about hip-width apart. If you feel unsteady, rest your hands on the ball near your hips. 3. As you do this exercise, try to keep your shoulders and upper body relaxed and still. 4. Using your stomach and back muscles to move your pelvis, roll the ball forward. This will round your back. 5. Still using your stomach and back muscles, roll the ball back. You will arch your back. 6. Repeat this rounding-arching motion a few times. 7. Stop in between the two positions, where your back is not rounded or arched. This is called your neutral position. Pelvic rotation   1. Sit tall on the ball. 2. Slowly rotate your hips in a [place] pattern. Keep the movement focused at your hips. 3. Repeat, but [place] in the other direction. 4. Repeat 8 to 12 times. Postural sitting   1. Use this position to find a stable, relaxed posture on the ball. You can use this position as your starting point for other ball exercises. If you feel unsteady on the ball, start on a chair first.  2. Sit on a ball or chair, with your feet planted straight in front of you.   3. Imagine that a string at the top of your head is pulling you straight up. Think of yourself as 2 inches taller than you are.  4. Slightly tuck your chin. 5. Keep your shoulders back and relaxed. Knee extension   1. Sit tall on the ball with your feet planted in front of you, hip-width apart. As you do this exercise, avoid slumping your shoulders and arching your back. 2. Rest your hands on the ball near your hip or a steady object next to you. (If you feel very stable on the ball, rest your hands in your lap or at your side.)  3. Slowly straighten one leg at the knee. Slowly lower it back down. Repeat with the other leg. 4. Repeat this exercise 8 to 12 times. Roll-ups   1. Lie on your back with your knees bent, feet resting on the floor. 2. Lay the ball on your thighs. Rest your hands up high on the ball. 3. Raising your head and shoulder blades, roll the ball up your thighs. Exhale as you roll up. 4. If this is hard on your neck, gently support your lower head and upper neck with one hand. Don't use that hand to pull your head up. 5. Repeat 8 to 12 times. Ball curls   1. Lie on your back with your ankles resting on the ball, knees straight. 2. Use your legs to roll the exercise ball toward you. Allow your knees to bend and move closer to your chest.  3. Pause briefly, and then roll the ball to the starting position. Try to keep the ball rolling straight. You will feel the muscles in your lower belly working. 4. Repeat 8 to 12 times. Bridge with ball under legs   1. Lie on your back with your legs up, calves resting on the ball. For more challenge, rest your heels on the ball. 2. Look up at the ceiling, and keep your chin relaxed. You can place a small pillow under your head or neck for comfort. 3. With your arms by your side, press your hands onto the floor for stability. 4. Tighten your belly muscles by pulling in your belly button toward your spine.   5. Push your heels down toward the floor, squeeze your buttocks, and lift your hips off the floor until your shoulders, hips, and knees are all in a straight line. 6. Try to keep the ball steady. Hold for about 6 seconds as you continue to breathe normally. 7. Slowly lower your hips back down to the floor. 8. Repeat 8 to 12 times. Ball curls with bridge   1. Start flat on your back with your ankles resting on the ball. 2. Look up at the ceiling, and keep your chin relaxed. You can place a small pillow under your head or neck for comfort. 3. With your arms by your side, press your hands onto the floor for stability. 4. Tighten your belly muscles by pulling in your belly button toward your spine. 5. Push your heels down toward the floor, squeeze your buttocks, and lift your hips off the floor until your shoulders, hips, and knees are all in a straight line. 6. While holding the bridge position, roll the ball toward you with your heels. Keep your hips as level as you can. 7. Pause briefly, and then roll the ball back out. Try to keep the ball rolling straight. You will feel the muscles in your lower belly working as you straighten your legs. 8. Lower your hips, and return to your starting position. 9. Repeat 8 to 12 times. 10. When you can keep your body and the ball steady throughout this exercise, you're ready for more challenge. Try keeping your hips raised while rolling the ball out, holding the bridge, and rolling back, a few times in a row. Praying halina   1. Kneel upright with the ball in front of you. 2. To start, clasp your hands together. Rest them on the ball in front of you. 3. As you do this exercise, keep your back and hips straight and tighten your belly and buttocks muscles. Keep your knees in place. 4. Press on the ball with your arms. Lean forward from the knees. This rolls the ball forward. You will bear most of your weight on your arms. 5. If your back starts to ache, you've gone too far. Pull back a bit. 6. Roll back to the start position.   7. Repeat 8 to 12 times. Walk-out plank on ball   1. Kneel over the ball. Place your hands on the floor in front of you. 2. Walk your hands forward until your legs are straight on the ball. This is the plank position. 3. When in plank position, hold your body straight and tighten your belly and buttocks muscles. Keep your chin slightly tucked. 4. Roll as far forward as you can without losing your balance or letting your hips drop. You may stop with the ball under your thighs, or even under your knees or shins. 5. Hold a few seconds, then walk your hands back and return to the start position. 6. Repeat 8 to 12 times. Push-up with thighs on ball   1. Kneel over the ball. Place your hands on the floor in front of you. 2. Walk your hands forward until your legs are straight on the ball. This is the plank position. 3. When in plank position, hold your body straight and tighten your belly and buttocks muscles. Keep your chin slightly tucked. 4. Roll as far forward as you can without losing your balance or letting your hips drop. You may stop with the ball under your thighs, or even under your knees or shins. 5. Bend your elbows. Slowly lower your body toward the ground as far as you can without losing your balance. 6. If your wrists hurt, try moving your hands a little farther apart so they're not right under your shoulders. 7. Slowly straighten your arms. 8. Do 8 to 12 of these push-ups. Wall squat with ball   1. Stand facing away from a wall. Place your feet about shoulder-width apart. 2. Place the ball between your middle back and the wall. Move your feet out in front of you so they are about a foot in front of your hips. 3. Keep your arms at your sides, or put your hands on your hips. 4. Slowly squat down as if you are going to sit in a chair, rolling your back over the ball as you squat. The ball should move with you but stay pressed into the wall.   5. Be sure that your knees do not go in front of your toes as you squat. 6. Hold for 6 seconds. 7. Slowly rise to your standing position. 8. Repeat 8 to 12 times. Child's pose with ball   1. Kneeling upright with your back straight, rest your hands on the ball in front of you. 2. Breathe out as you bend at the hips, and roll the ball forward. Lower your chest toward the ground, and drop your hips back toward your heels. 3. To stretch your upper back and shoulders, hold this position for 15 to 30 seconds. 4. Repeat 2 to 4 times. Patient Education        Leg and Ankle Edema: Care Instructions  Your Care Instructions  Swelling in the legs, ankles, and feet is called edema. It is common after you sit or stand for a while. Long plane flights or car rides often cause swelling in the legs and feet. You may also have swelling if you have to stand for long periods of time at your job. Problems with the veins in the legs (varicose veins) and changes in hormones can also cause swelling. Sometimes the swelling in the ankles and feet is caused by a more serious problem, such as heart failure, infection, blood clots, or liver or kidney disease. Follow-up care is a key part of your treatment and safety. Be sure to make and go to all appointments, and call your doctor if you are having problems. It's also a good idea to know your test results and keep a list of the medicines you take. How can you care for yourself at home? · If your doctor gave you medicine, take it as prescribed. Call your doctor if you think you are having a problem with your medicine. · Whenever you are resting, raise your legs up. Try to keep the swollen area higher than the level of your heart. · Take breaks from standing or sitting in one position. ? Walk around to increase the blood flow in your lower legs. ? Move your feet and ankles often while you stand, or tighten and relax your leg muscles. · Wear support stockings. Put them on in the morning, before swelling gets worse.   · Eat a balanced diet. Lose weight if you need to. · Limit the amount of salt (sodium) in your diet. Salt holds fluid in the body and may increase swelling. When should you call for help? Call 911 anytime you think you may need emergency care. For example, call if:    · You have symptoms of a blood clot in your lung (called a pulmonary embolism). These may include:  ? Sudden chest pain. ? Trouble breathing. ? Coughing up blood. Call your doctor now or seek immediate medical care if:    · You have signs of a blood clot, such as:  ? Pain in your calf, back of the knee, thigh, or groin. ? Redness and swelling in your leg or groin.     · You have symptoms of infection, such as:  ? Increased pain, swelling, warmth, or redness. ? Red streaks or pus. ? A fever. Watch closely for changes in your health, and be sure to contact your doctor if:    · Your swelling is getting worse.     · You have new or worsening pain in your legs.     · You do not get better as expected. Where can you learn more? Go to https://iiMonde.BioVidria. org and sign in to your [employer] account. Enter M601 in the [facility] box to learn more about \"Leg and Ankle Edema: Care Instructions. \"     If you do not have an account, please click on the \"Sign Up Now\" link. Current as of: July 1, 2021               Content Version: 13.1  © 2006-2021 Healthwise, Incorporated. Care instructions adapted under license by [facility] ([facility]). If you have questions about a medical condition or this instruction, always ask your healthcare professional. [place] [address] any warranty or liability for your use of this information. Patient Education        Purine-Restricted Diet: Care Instructions  Your Care Instructions     Purines are substances that are found in some foods. Your body turns purines into uric acid.  High levels of uric acid can cause gout, which is a form of arthritis that causes pain and inflammation in joints. You may be able to help control the amount of uric acid in your body by limiting high-purine foods in your diet. Follow-up care is a key part of your treatment and safety. Be sure to make and go to all appointments, and call your doctor if you are having problems. It's also a good idea to know your test results and keep a list of the medicines you take. How can you care for yourself at home? · Plan your meals and snacks around foods that are low in purines and are safe for you to eat. These foods include:  ? Green vegetables and tomatoes. ? Fruits. ? Whole-grain breads, rice, and cereals. ? Eggs, peanut butter, and nuts. ? Low-fat milk, cheese, and other milk products. ? Popcorn. ? Gelatin desserts, chocolate, cocoa, and cakes and sweets, in small amounts. · You can eat certain foods that are medium-high in purines, but eat them only once in a while. These foods include:  ? Legumes, such as dried beans and dried peas. You can have 1 cup cooked legumes each day. ? Asparagus, cauliflower, spinach, mushrooms, and green peas. ? Fish and seafood (other than very high-purine seafood). ? Oatmeal, wheat bran, and wheat germ. · Limit very high-purine foods, including:  ? Organ meats, such as liver, kidneys, sweetbreads, and brains. ? Meats, including reddy, beef, pork, and lamb. ? Game meats and any other meats in large amounts. ? Anchovies, sardines, herring, mackerel, and scallops. ? Gravy. ? Beer. Where can you learn more? Go to https://[email or web address] [email or web address] org and sign in to your [employer] account. Enter F448 in the [facility] box to learn more about \"Purine-Restricted Diet: Care Instructions. \"     If you do not have an account, please click on the \"Sign Up Now\" link. Current as of: September 8, 2021               Content Version: 13.1  © 2006-2021 Healthwise, Incorporated. Care instructions adapted under license by [facility] ([facility]).  If you have questions about a medical condition or this instruction, always ask your healthcare professional. [address] any warranty or liability for your use of this information. Patient Education        Low Sodium Diet (2,000 Milligram): Care Instructions  Overview     Limiting sodium can be an important part of managing some health problems. The most common source of sodium is salt. People get most of the salt in their diet from canned, prepared, and packaged foods. Fast food and restaurant meals also are very high in sodium. Your doctor will probably limit your sodium to less than 2,000 milligrams (mg) a day. This limit counts all the sodium in prepared and packaged foods and any salt you add to your food. Follow-up care is a key part of your treatment and safety. Be sure to make and go to all appointments, and call your doctor if you are having problems. It's also a good idea to know your test results and keep a list of the medicines you take. How can you care for yourself at home? Read food labels  · Read labels on cans and food packages. The labels tell you how much sodium is in each serving. Make sure that you look at the serving size. If you eat more than the serving size, you have eaten more sodium. · Food labels also tell you the Percent Daily Value for sodium. Choose products with low Percent Daily Values for sodium. · Be aware that sodium can come in forms other than salt, including monosodium glutamate (MSG), sodium citrate, and sodium bicarbonate (baking soda). MSG is often added to Asian food. When you eat out, you can sometimes ask for food without MSG or added salt. Buy low-sodium foods  · Buy foods that are labeled \"unsalted\" (no salt added), \"sodium-free\" (less than 5 mg of sodium per serving), or \"low-sodium\" (140 mg or less of sodium per serving). Foods labeled \"reduced-sodium\" and \"light sodium\" may still have too much sodium.  Be sure to read the label to see how much sodium you are getting. · Buy fresh vegetables, or frozen vegetables without added sauces. Buy low-sodium versions of canned vegetables, soups, and other canned goods. Prepare low-sodium meals  · Cut back on the amount of salt you use in cooking. This will help you adjust to the taste. Do not add salt after cooking. One teaspoon of salt has about 2,300 mg of sodium. · Take the salt shaker off the table. · Flavor your food with garlic, lemon juice, onion, vinegar, herbs, and spices. Do not use soy sauce, lite soy sauce, steak sauce, onion salt, garlic salt, celery salt, or ketchup on your food. · Use low-sodium salad dressings, sauces, and ketchup. Or make your own salad dressings and sauces without adding salt. · Use less salt (or none) when recipes call for it. You can often use half the salt a recipe calls for without losing flavor. Other foods such as rice, pasta, and grains do not need added salt. · Rinse canned vegetables, and cook them in fresh water. This removes somebut not allof the salt. · Avoid water that is naturally high in sodium or that has been treated with water softeners, which add sodium. If you buy bottled water, read the label and choose a sodium-free brand. Avoid high-sodium foods  · Avoid eating:  ? Smoked, cured, salted, and canned meat, fish, and poultry. ? Ham, reddy, hot dogs, and luncheon meats. ? Regular, hard, and processed cheese and regular peanut butter. ? Crackers with salted tops, and other salted snack foods such as pretzels, chips, and salted popcorn. ? Frozen prepared meals, unless labeled low-sodium. ? Canned and dried soups, broths, and bouillon, unless labeled sodium-free or low-sodium. ? Canned vegetables, unless labeled sodium-free or low-sodium. ? [place] fries, pizza, tacos, and other fast foods. ? Pickles, olives, ketchup, and other condiments, especially soy sauce, unless labeled sodium-free or low-sodium. Where can you learn more?   Go to https://chpepiceweb.[email or web address] [email or web address] org and sign in to your [employer] account. Enter Z814 in the [facility] box to learn more about \"Low Sodium Diet (2,000 Milligram): Care Instructions. \"     If you do not have an account, please click on the \"Sign Up Now\" link. Current as of: September 8, 2021               Content Version: 13.1  © 2006-2021 [employer]. Care instructions adapted under license by [facility] ([facility]). If you have questions about a medical condition or this instruction, always ask your healthcare professional. [place] [address] any warranty or liability for your use of this information. Patient Education        Learning About Low-Sodium Foods  What foods are low in sodium? The foods you eat contain nutrients, such as vitamins and minerals. Sodium is a nutrient. Your body needs the right amount to stay healthy and work as it should. You can use the list below to help you make choices about which foods to eat.   Fruits  · Fresh, frozen, canned, or dried fruit  Vegetables  · Fresh or frozen vegetables, with no added salt  · Canned vegetables, low-sodium or with no added salt  Grains  · Bagels without salted tops  · Cereal with no added salt  · Corn tortillas  · Crackers with no added salt  · Oatmeal, cooked without salt  · Popcorn with no salt  · Pasta and noodles, cooked without salt  · Rice, cooked without salt  · Unsalted pretzels  Dairy and dairy alternatives  · Butter, unsalted  · Cream cheese  · Ice cream  · Milk  · Soy milk  Meats and other protein foods  · Beans and peas, canned with no salt  · Eggs  · Fresh fish (not smoked)  · Fresh meats (not smoked or cured)  · Nuts and nut butter, prepared without salt  · Poultry, not packaged with sodium solution  · Tofu, unseasoned  · Tuna, canned without salt  Seasonings  · Garlic  · Herbs and spices  · Lemon juice  · Mustard  · Olive oil  · Salt-free seasoning mixes  · Vinegar  Work with your doctor to find out how much of this nutrient you need. Depending on your health, you may need more or less of it in your diet. Where can you learn more? Go to https://[email or web address] org and sign in to your [employer] account. Enter U795 in the [facility] box to learn more about \"Learning About Low-Sodium Foods. \"     If you do not have an account, please click on the \"Sign Up Now\" link. Current as of: September 8, 2021               Content Version: 13.1  © 8829-4879 Healthwise, Incorporated. Care instructions adapted under license by [facility] [place] ([facility]). If you have questions about a medical condition or this instruction, always ask your healthcare professional. [address] any warranty or liability for your use of this information.

## 2022-01-06 NOTE — PROGRESS NOTES
Gina Caballero (:  1950) is a 66 y.o. male,New patient, here for evaluation of the following chief complaint(s):  New Patient (est care ) and Edema (1+ pitting edema )         ASSESSMENT/PLAN:  1. Edema of both lower legs  -     ECHO Complete 2D W Doppler W Color; Future  -     Comprehensive Metabolic Panel; Future  2. Decreased GFR  -     Comprehensive Metabolic Panel; Future  3. Intermittent left-sided chest pain  -     EKG 12 lead; Future  -     ECHO Complete 2D W Doppler W Color; Future  4. Chronic gouty arthritis  -     allopurinol (ZYLOPRIM) 100 MG tablet; Take 0.5 tablets by mouth daily, Disp-15 tablet, R-2Normal    Start Allopurinol 100 mg, take 1/2 tablet daily  Low sodium diet  Stay hydrated  Compression socks, elevate legs when sitting  Repeat lab work in 1 month to evaluate kidney function  EKG and echocardiogram due to intermittent chest pain and swelling of lower legs    Return in about 4 weeks (around 2/3/2022) for follow up after lab work. .         Subjective   SUBJECTIVE/OBJECTIVE:  HPI  Previously seen at [facility]  Hx kidney stones. Previously seen by urology and had surgery to remove, had complications. Hx gout./ gouty arthritis, multiple sites. Has been off Allopurinol for 3 years. Has the pain in his wrists, fingers, shoulders, knees, ankles, feet. Has a severe flare about every other year, but has less severe flare up at least once per year. He tries to walk a lot. He tries to drink more water. In the ER  for gout, foot pain, and lower extremity edema. His GFR was 59, BUN 36, Cr 1.2. Uric acid was 10.1    Xray left foot showed: Impression       Moderate chronic arthritic changes are most pronounced at the first MTP joint where there is marginal spurring and small erosions consistent with the clinical history of inflammatory arthropathy. Generalized soft tissue swelling is most pronounced along    the dorsal aspect of the foot.  Additional chronic arthritic changes are discussed. No fracture or acute bony abnormality is observed. Lumbar spine xray:   Impression   1. Mild thoracolumbar dextro scoliosis. Cannot exclude muscle spasm left side. 2. Moderate disc space narrowing L3-4 and L5/S1. Mild disc space narrowing L4-5. Antegrade spondylolisthesis of L4 upon L5, 5 mm. Mild retrolisthesis of L5 upon S1, 4 mm. 3. Mild vertebral body spondylosis scattered in the lumbar spine. Moderate facet arthropathy L4-5 bilaterally. No fracture seen. Sacroiliac joints unremarkable. Had some bilateral lower leg swelling. US in ER was negative for DVT. Varies back and forth in severity between legs. Has been worsening over the last few weeks. He has noticed some upper left chest pain, mostly noted with movement. That has improved over the few weeks. He feels some occasional congestion, needed to take a deep breath and cough, then it seems to get better. Kirk Bowenates He reports a heart catheterization in the past. But not sure the physician's name, and I cannot find that record in our system. There is an echocardiogram in 2011 which showed:  Left ventricle:  Size was normal.  Systolic function was normal. Ejection fraction was estimated in the range of 55 % to 65 %. There were no regional wall motion abnormalities.   Mitral valve: There was mild regurgitation.   Tricuspid valve: There was mild regurgitation.   Pulmonary arteries:  Systolic pressure was at the upper limits of normal. Estimated peak pressure was 30 mmHg. Review of Systems   Cardiovascular: Positive for leg swelling. Musculoskeletal: Positive for arthralgias (3/10). All other systems reviewed and are negative. Objective   Physical Exam  Vitals and nursing note reviewed. HENT:      Head: Normocephalic. Right Ear: External ear normal.      Left Ear: External ear normal.   Eyes:      Pupils: Pupils are equal, round, and reactive to light. Neck:      Trachea: No tracheal deviation. Cardiovascular:      Rate and Rhythm: Normal rate and regular rhythm. Heart sounds: Normal heart sounds. No murmur heard. No friction rub. No gallop. Pulmonary:      Effort: Pulmonary effort is normal. No respiratory distress. Breath sounds: Normal breath sounds. No wheezing or rales. Chest:      Chest wall: No tenderness. Abdominal:      General: Bowel sounds are normal.      Palpations: Abdomen is soft. Tenderness: There is no abdominal tenderness. There is no rebound. Genitourinary:     Penis: Normal.    Musculoskeletal:         General: Normal range of motion. Cervical back: Full passive range of motion without pain, normal range of motion and neck supple. Right lower le+ Pitting Edema present. Left lower le+ Pitting Edema present. Lymphadenopathy:      Cervical: No cervical adenopathy. Skin:     General: Skin is warm and dry. Findings: No rash. Neurological:      Mental Status: He is alert and oriented to person, place, and time. Psychiatric:         Judgment: Judgment normal.            On this date 2022 I have spent 60 minutes reviewing previous notes, test results and face to face with the patient discussing the diagnosis and importance of compliance with the treatment plan as well as documenting on the day of the visit. An electronic signature was used to authenticate this note.     --ISAIAH Hess - CNP

## 2022-01-06 NOTE — TELEPHONE ENCOUNTER
----- Message from Cornel Castleman, APRN - CNP sent at 1/6/2022  4:59 PM EST -----  I did not intend for this gentleman's labs to be redrawn today, was going to wait a couple of weeks, however they are much improved and his kidney function is much better. Still go ahead and start with just the allopurinol at 50 mg, and will evaluate his progress at follow-up in 1 month.

## 2022-01-06 NOTE — TELEPHONE ENCOUNTER
----- Message from ISAIAH Lockhart CNP sent at 1/6/2022 11:52 AM EST -----  EKG showed normal rhythm, but did have a few subtle changes that, given his description of chest pain and ankle swelling, prompts me to refer to cardiology for further evaluation. Will place order, and they will call to schedule.

## 2022-01-07 LAB
EKG ATRIAL RATE: 63 BPM
EKG P-R INTERVAL: 180 MS
EKG Q-T INTERVAL: 414 MS
EKG QRS DURATION: 96 MS
EKG QTC CALCULATION (BAZETT): 423 MS
EKG R AXIS: -33 DEGREES
EKG T AXIS: 52 DEGREES
EKG VENTRICULAR RATE: 63 BPM

## 2022-01-07 PROCEDURE — 93010 ELECTROCARDIOGRAM REPORT: CPT | Performed by: INTERNAL MEDICINE

## 2022-01-13 ENCOUNTER — TELEPHONE (OUTPATIENT)
Dept: FAMILY MEDICINE CLINIC | Age: 72
End: 2022-01-13

## 2022-01-13 NOTE — TELEPHONE ENCOUNTER
Patient called and stated that he missed a call from our office. I explained to the  Patient that I did not call him this morning and asked if he needed anything. Patient states that he did not need anything at this moment.

## 2022-01-21 ENCOUNTER — OFFICE VISIT (OUTPATIENT)
Dept: CARDIOLOGY CLINIC | Age: 72
End: 2022-01-21
Payer: MEDICARE

## 2022-01-21 VITALS
WEIGHT: 201.2 LBS | DIASTOLIC BLOOD PRESSURE: 58 MMHG | HEART RATE: 70 BPM | BODY MASS INDEX: 28.8 KG/M2 | SYSTOLIC BLOOD PRESSURE: 108 MMHG | HEIGHT: 70 IN

## 2022-01-21 DIAGNOSIS — R06.02 SOB (SHORTNESS OF BREATH): ICD-10-CM

## 2022-01-21 DIAGNOSIS — R94.31 ABNORMAL EKG: ICD-10-CM

## 2022-01-21 DIAGNOSIS — I20.8 OTHER FORMS OF ANGINA PECTORIS (HCC): ICD-10-CM

## 2022-01-21 DIAGNOSIS — M79.89 LEG SWELLING: Primary | ICD-10-CM

## 2022-01-21 PROCEDURE — G8427 DOCREV CUR MEDS BY ELIG CLIN: HCPCS | Performed by: INTERNAL MEDICINE

## 2022-01-21 PROCEDURE — 99204 OFFICE O/P NEW MOD 45 MIN: CPT | Performed by: INTERNAL MEDICINE

## 2022-01-21 PROCEDURE — G8484 FLU IMMUNIZE NO ADMIN: HCPCS | Performed by: INTERNAL MEDICINE

## 2022-01-21 PROCEDURE — G8417 CALC BMI ABV UP PARAM F/U: HCPCS | Performed by: INTERNAL MEDICINE

## 2022-01-21 RX ORDER — FUROSEMIDE 20 MG/1
20 TABLET ORAL
Qty: 90 TABLET | Refills: 1 | Status: SHIPPED | OUTPATIENT
Start: 2022-01-21

## 2022-01-21 NOTE — PROGRESS NOTES
[facility]  [address] [address]  Dept: [phone]  Dept Fax: [phone]  Loc: [phone]    Visit Date: 1/21/2022    Mr. Kojo Wilkinson is a 66 y.o. male  who presented for:    New patient referral  Abnormal EKG   Chest pain   LEG SWELLING     PCP:Kimi Munoz, APRN - CNP  HPI:   HPI Little Osgood is a pleasant 66year old male patient who  has a past medical history of Gout, Hypertension, Kidney stone, and Pneumothorax on right. Patient reports no FH of premature CAD. He denies cigarette smoking (quit many years ago), alcohol or drug abuse. He was referred to cardiology by his PCP after he was noted to have intermittent chest pains, leg swelling, abnormal EKG. He is on Rx for Gout. LE dopplers were negative for DVT. EKG revealed SR, left axis deviation, nonspecific dynamic TW changes in anterior leads. He wears compression stockings to help with leg swelling. He states that chest pain was left sided, radiated to shoulder, occurred at rest, associated with SOB. Current Outpatient Medications:     allopurinol (ZYLOPRIM) 100 MG tablet, Take 0.5 tablets by mouth daily, Disp: 15 tablet, Rfl: 2    colchicine (COLCRYS) 0.6 MG tablet, Take one tab daily until gout flare up resolves. , Disp: 15 tablet, Rfl: 0    Past Medical History  [facility]  has a past medical history of Gout, Hypertension, Kidney stone, and Pneumothorax on right. Social History  [facility]  reports that he quit smoking about 35 years ago. His smoking use included cigarettes. He has never used smokeless tobacco. He reports that he does not drink alcohol and does not use drugs. Family History  [facility] family history includes Cancer in his father; Other in his mother.     Past Surgical History   Past Surgical History:   Procedure Laterality Date    KIDNEY SURGERY      SHOULDER SURGERY Left        Review of Systems   Constitutional: Negative for chills and fever  HENT: Negative for congestion, sinus pressure, sneezing and sore throat. Eyes: Negative for pain, discharge, redness and itching. Respiratory: Negative for apnea, cough  Gastrointestinal: Negative for blood in stool, constipation, diarrhea   Endocrine: Negative for cold intolerance, heat intolerance, polydipsia. Genitourinary: Negative for dysuria, enuresis, flank pain and hematuria. Musculoskeletal: Negative for arthralgias, joint swelling and neck pain. Neurological: Negative for numbness and headaches. Psychiatric/Behavioral: Negative for agitation, confusion, decreased concentration and dysphoric mood. Objective: There were no vitals taken for this visit. Wt Readings from Last 3 Encounters:   01/06/22 201 lb (91.2 kg)   08/23/17 234 lb (106.1 kg)   09/02/16 230 lb (104.3 kg)     BP Readings from Last 3 Encounters:   01/06/22 118/68   12/29/21 (!) 143/77   08/24/17 118/61       Nursing note and vitals reviewed. Physical Exam   Constitutional: Oriented to person, place, and time. Appears well-developed and well-nourished. ENT: Moist mucous membranes. No bleeding. Tongue is midline. Head: Normocephalic and atraumatic. Eyes: EOM are normal. Pupils are equal, round, and reactive to light. Neck: Normal range of motion. Neck supple. No JVD present. Cardiovascular: Normal rate, regular rhythm, N0 murmur, no rubs, and intact distal pulses. Pulmonary/Chest: Effort normal and breath sounds normal. No respiratory distress. No wheezes. No rales. Abdominal: Soft. Bowel sounds are normal. No distension. There is no tenderness. Musculoskeletal: Normal range of motion. mild edema. Neurological: Alert and oriented to person, place, and time. No cranial nerve deficit. Coordination normal.   Skin: Skin is warm and dry. Psychiatric: Normal mood and affect.        No results found for: CKTOTAL, CKMB, CKMBINDEX    Lab Results   Component Value Date    WBC 10.8 12/29/2021    RBC 4.76 2021    HGB 14.9 2021    HCT 43.3 2021    MCV 91.0 2021    MCH 31.3 2021    MCHC 34.4 2021    RDW 14.8 2017     2021    MPV 10.0 2021       Lab Results   Component Value Date     2022    K 4.6 2022    K 4.0 2021     2022    CO2 30 2022    BUN 21 2022    LABALBU 4.4 2022    CREATININE 1.0 2022    CALCIUM 9.6 2022    LABGLOM 73 2022    GLUCOSE 89 2022       Lab Results   Component Value Date    ALKPHOS 105 2022    ALT 27 2022    AST 20 2022    PROT 6.9 2022    BILITOT 0.7 2022    LABALBU 4.4 2022       No results found for: MG    No results found for: INR, PROTIME      No results found for: LABA1C    No results found for: TRIG, HDL, LDLCALC, LDLDIRECT, LABVLDL    No results found for: TSH      Testing Reviewed:      I have individually reviewed the cardiac test below:    ECHO: Results for orders placed in visit on 11    ECHO Complete 2D W Doppler W Color    Narrative  [facility] [address], [address]  Phone: [phone]  Fax: [phone]    Transthoracic Echocardiogram  2D, M-mode, Doppler, and Color Doppler    Name: Yulisa Petit  : [id number]  MR #: [id number]  Study date: 2011  Account #: [de-identified]  Age: 56 years  Gender: Male  Ht-Wt-BSA: 79 in- 251.5 lb- 2.3 mÂ²    Reading Physician:  Cy Grider MD  Technologist:  Juan Sparrow, RRT, RDCS  Referring Physician:  Cy Grider MD    Reason for study: Acute chest pain. HISTORY: Symptoms: chest pain. PROCEDURE: The procedure was performed at the bedside. The procedure was performed as an inpatient. This was a routine  study. The transthoracic approach was used. The study included complete 2D imaging, M-mode, complete spectral Doppler, and color Doppler. Systolic blood pressure was 160 mmHg, at the start of the study.  Diastolic blood pressure was 90 mmHg, at the start of  the study. Image quality was adequate. LEFT VENTRICLE: Size was normal. Systolic function was normal. Ejection fraction was estimated in the range of 55 % to  65 %. There were no regional wall motion abnormalities. Wall thickness was normal. DOPPLER: Left ventricular diastolic  function parameters were normal.    AORTIC VALVE: The valve was trileaflet. Leaflets exhibited normal thickness and normal cuspal separation. DOPPLER:  Transaortic velocity was within the normal range. There was no evidence for stenosis. There was no regurgitation. AORTA: The root exhibited normal size. MITRAL VALVE: Valve structure was normal. There was normal leaflet separation. DOPPLER: The transmitral velocity was  within the normal range. There was no evidence for stenosis. There was mild regurgitation. LEFT ATRIUM: Size was at the upper limits of normal.    RIGHT VENTRICLE: The size was normal. Systolic function was normal. Wall thickness was normal.    PULMONIC VALVE: Leaflets exhibited normal thickness, no calcification, and normal cuspal separation. DOPPLER: The  transpulmonic velocity was within the normal range. There was no regurgitation. PULMONARY ARTERY: The size was normal. DOPPLER: Systolic pressure was at the upper limits of normal. Estimated peak  pressure was 30 mmHg. TRICUSPID VALVE: The valve structure was normal. There was normal leaflet separation. DOPPLER: The transtricuspid  velocity was within the normal range. There was no evidence for stenosis. There was mild regurgitation. RIGHT ATRIUM: Size was normal.    SYSTEMIC VEINS: IVC: The inferior vena cava was normal in size. PERICARDIUM: There was no pericardial effusion. The pericardium was normal in appearance. SYSTEM MEASUREMENT TABLES    2D mode  LA Dimension (2D): 4.6 cm  FS (2D-Cubed): 31 %  FS (2D-Teich): 31 %  IVSd (2D): 1.3 cm  IVSd;  Mean chosen (2D): 1.3 cm  LVIDd (2D): 5.1 cm  LVIDs (2D): 3.5 cm  LVOT Area (2D): 3.1 cm2  LVPWd (2D): 1.3 cm  RVIDd (2D): 3 cm    M mode  AoR Diam (MM): 3.8 cm  AV Cusp Sep (MM): 2.3 cm  MV D-E Exc: 1.5 cm  MV EF Wagoner (MM): 12.8 cm/s    Unspecified Scan Mode  CV Orifice Area; Cont Eq by VTI: 2.8 cm2  VTI; Antegrade Flow: 30.9 cm  Vmax; Antegrade Flow: 1.5 m/s  LVOT Diam: 2 cm  LVOT Vmax: 133 cm/s  MV Peak A Antonio; Mean: 59.9 cm/s  MV Peak E Antonio; Antegrade Flow: 96.7 cm/s  Vmax; Antegrade Flow: 74 cm/s  Vmax; Mean; Antegrade Flow: 74 cm/s  TV Peak A Antonio: 61.9 cm/s  TV Peak E Antonio; Antegrade Flow: 69.3 cm/s    SUMMARY:    Left ventricle:  Size was normal.  Systolic function was normal. Ejection fraction was estimated in the range of 55 % to 65 %. There were no regional wall motion abnormalities. Mitral valve: There was mild regurgitation. Tricuspid valve: There was mild regurgitation. Pulmonary arteries:  Systolic pressure was at the upper limits of normal. Estimated peak pressure was 30 mmHg. Prepared and signed by    Ash Gant MD  Signed 11-Feb-2011 16:54:55     AssessmentPlan:   Coby Leiva is a pleasant 66year old male patient who  has a past medical history of Gout, Hypertension, Kidney stone, and Pneumothorax on right. Patient reports no FH of premature CAD. He denies cigarette smoking (quit many years ago), alcohol or drug abuse. He was referred to cardiology by his PCP after he was noted to have intermittent chest pains, leg swelling, abnormal EKG. He is on Rx for Gout. LE dopplers were negative for DVT. EKG revealed SR, left axis deviation, nonspecific dynamic TW changes in anterior leads. He wears compression stockings to help with leg swelling. He states that chest pain was left sided, radiated to shoulder, occurred at rest, associated with SOB. 1. Chest pain   2. Leg swelling   3. Shortness of breath   4. Abnormal EKG  5. Hypertension ? 6.  H/o Gout      He was referred to cardiology by his PCP after he was noted to have intermittent chest pains, leg swelling, abnormal EKG   He is on Rx for Gout. LE dopplers were negative for DVT   EKG revealed SR, left axis deviation, nonspecific dynamic TW changes in anterior leads   DDX includes CHF, CAD   /58   Add lasix 20 mg po q 48  Hours   Limit Na intake   Daily weight   BMP, Mg in one week    Will need to investigate for underlying structural heart disease, will proceed with a transthoracic echocardiogram    Based on patient's risk factors and clinical presentation, stress test is indicated to investigate for possible underlying ischemic heart disease. Patient  agrees with that work up and its risks and benefits and potential need for additional testing if stress test is abnormal such as cardiac catheterization    Check lipid panel        Above findings and plan of care were discussed with patient in details, patient's questions were answered.      Disposition:  RTC in 1 year             Electronically signed by Fransisca Elise MD, [facility] [place], [place]    1/21/2022 at 8:39 AM EST

## 2022-04-06 DIAGNOSIS — M1A.00X0 CHRONIC GOUTY ARTHRITIS: ICD-10-CM

## 2022-04-06 RX ORDER — ALLOPURINOL 100 MG/1
100 TABLET ORAL DAILY
Qty: 30 TABLET | Refills: 2 | Status: SHIPPED | OUTPATIENT
Start: 2022-04-06 | End: 2022-06-14 | Stop reason: SDUPTHER

## 2022-04-06 NOTE — TELEPHONE ENCOUNTER
He may increase to 1 tablet daily, based on his last kidney numbers. New script sent to [facility]. Please notify patient and remind him that there appears to be lab work ordered by cardiology that he should complete. Thanks.

## 2022-05-26 DIAGNOSIS — M1A.00X0 CHRONIC GOUTY ARTHRITIS: ICD-10-CM

## 2022-05-26 NOTE — TELEPHONE ENCOUNTER
Please approve or deny     Last Visit Date:  1/6/2022       Next Visit Date:    Visit date not found

## 2022-05-27 RX ORDER — ALLOPURINOL 100 MG/1
100 TABLET ORAL DAILY
Qty: 30 TABLET | Refills: 2 | OUTPATIENT
Start: 2022-05-27

## 2022-05-27 NOTE — TELEPHONE ENCOUNTER
This patient is a [facility] patient. Pennie Buerger has never seen this patient in the office. Shahram Mary Beth sent a prescription in for this medication on 04- with #30 R-2 so he should still have refills left. [facility] refused this medication and Esther Buerger is out of the office until Tuesday, 05-.

## 2022-05-27 NOTE — TELEPHONE ENCOUNTER
I will let him know about the refills but he wants to see Belle Hung.  He asked yesterday when he came in because he does not want to come to [place]

## 2022-06-14 ENCOUNTER — OFFICE VISIT (OUTPATIENT)
Dept: FAMILY MEDICINE CLINIC | Age: 72
End: 2022-06-14
Payer: MEDICARE

## 2022-06-14 VITALS
OXYGEN SATURATION: 98 % | DIASTOLIC BLOOD PRESSURE: 82 MMHG | BODY MASS INDEX: 28.61 KG/M2 | HEART RATE: 73 BPM | HEIGHT: 68 IN | WEIGHT: 188.8 LBS | TEMPERATURE: 97.9 F | SYSTOLIC BLOOD PRESSURE: 136 MMHG

## 2022-06-14 DIAGNOSIS — M1A.00X0 CHRONIC GOUTY ARTHRITIS: ICD-10-CM

## 2022-06-14 PROCEDURE — 99213 OFFICE O/P EST LOW 20 MIN: CPT | Performed by: NURSE PRACTITIONER

## 2022-06-14 PROCEDURE — G8427 DOCREV CUR MEDS BY ELIG CLIN: HCPCS | Performed by: NURSE PRACTITIONER

## 2022-06-14 PROCEDURE — 1123F ACP DISCUSS/DSCN MKR DOCD: CPT | Performed by: NURSE PRACTITIONER

## 2022-06-14 PROCEDURE — 3017F COLORECTAL CA SCREEN DOC REV: CPT | Performed by: NURSE PRACTITIONER

## 2022-06-14 PROCEDURE — 1036F TOBACCO NON-USER: CPT | Performed by: NURSE PRACTITIONER

## 2022-06-14 PROCEDURE — G8417 CALC BMI ABV UP PARAM F/U: HCPCS | Performed by: NURSE PRACTITIONER

## 2022-06-14 RX ORDER — ALLOPURINOL 100 MG/1
100 TABLET ORAL DAILY
Qty: 90 TABLET | Refills: 1 | Status: SHIPPED | OUTPATIENT
Start: 2022-06-14

## 2022-06-14 ASSESSMENT — ENCOUNTER SYMPTOMS
COUGH: 0
ABDOMINAL DISTENTION: 0
CONSTIPATION: 0
SHORTNESS OF BREATH: 0
CHEST TIGHTNESS: 0
WHEEZING: 0
EYE REDNESS: 0
ABDOMINAL PAIN: 0
DIARRHEA: 0
EYE PAIN: 0

## 2022-06-14 NOTE — PATIENT INSTRUCTIONS
Patient Education        Gout: Care Instructions  Overview     Gout is a form of arthritis caused by a buildup of uric acid crystals in a joint. It causes sudden attacks of pain, swelling, redness, and stiffness,usually in one joint, especially the big toe. Gout usually comes on without a cause. But it can be brought on by drinking alcohol (especially beer), eating or drinking things made with high-fructose corn syrup, or eating seafood or red meat. Taking certain medicines, such asdiuretics, can also trigger an attack of gout. Taking your medicines as prescribed and following up with your doctor regularlycan help you avoid gout attacks in the future. Follow-up care is a key part of your treatment and safety. Be sure to make and go to all appointments, and call your doctor if you are having problems. It's also a good idea to know your test results and keep alist of the medicines you take. How can you care for yourself at home?  If the joint is swollen, put ice or a cold pack on the area for 10 to 20 minutes at a time. Put a thin cloth between the ice and your skin.  Prop up the sore limb on a pillow when you ice it or anytime you sit or lie down during the next 3 days. Try to keep it above the level of your heart. This can help reduce swelling.  Rest sore joints. Avoid activities that put weight or strain on the joints for a few days. Take short rest breaks from your regular activities during the day.  Take your medicines exactly as prescribed. Call your doctor if you think you are having a problem with your medicine.  Take pain medicines exactly as directed. ? If the doctor gave you a prescription medicine for pain, take it as prescribed. ? If you are not taking a prescription pain medicine, ask your doctor if you can take an over-the-counter medicine.  Eat less seafood and red meat.  Avoid foods or drinks that are made with high-fructose corn syrup.    Check with your doctor before drinking alcohol.  Losing weight, if you are overweight, may help reduce attacks of gout. But do not go on a diet that causes rapid weight loss. Losing a lot of weight in a short amount of time can cause a gout attack. When should you call for help? Call your doctor now or seek immediate medical care if:     You have a fever.      The joint is so painful you cannot use it.      You have sudden, unexplained swelling, redness, warmth, or severe pain in one or more joints. Watch closely for changes in your health, and be sure to contact your doctor if:     You have joint pain.      Your symptoms get worse or are not improving after 2 or 3 days. Where can you learn more? Go to https://Crisp Media.E Ink Holdings. org and sign in to your Loaded Pocket account. Enter T748 in the Lov[employer] Spoonfuls box to learn more about \"Gout: Care Instructions. \"     If you do not have an account, please click on the \"Sign Up Now\" link. Current as of: December 20, 2021               Content Version: 13.2  © 2006-2022 [employer]. Care instructions adapted under license by [facility] . If you have questions about a medical condition or this instruction, always ask your healthcare professional. [place] [address] any warranty or liability for your use of this information. Patient Education        Learning About Pseudogout  What is it? Pseudogout is a type of arthritis. It causes pain, redness, heat, and swellingin the joints. What causes it? When you have pseudogout, tiny calcium crystals form in your joint, most often the knee. Over time, these crystals may damage the cartilage of the joint. Asthis happens, the bones rub together and cause joint pain. What are the symptoms? Symptoms include pain, redness, heat, and swelling in joints, usually the knee. Symptoms usually begin quickly. Pain and swelling can last days or weeks. For some people, these symptoms are an ongoing problem. Others may just have flare-ups now and then. Call your doctor if you get a fever or treatment isn'thelping. How is it diagnosed? Your doctor will ask questions about your symptoms and past health. He or she will do a physical exam and blood tests. Your doctor may also take a sample of fluid from your joint to look for crystals. And you may have imaging tests,such as an X-ray, to look for joint damage. How is it treated? The goal of treatment is to relieve symptoms and protect your joint. The typeof treatment depends on how often you have symptoms and how bad they are. For some people who have flare-ups now and then, over-the-counter medicines can help. To reduce pain and swelling, your doctor may remove some of the fluid from the joint. And he or she may also give injections into the joint. Resting the joint and using ice or cold packs on the area for 10 to 20 minutes at atime can also help. Other people may need prescription medicines to treat their symptoms or to help keep symptoms from coming back. And people who have ongoing problems may needto take a daily medicine. Follow-up care is a key part of your treatment and safety. Be sure to make and go to all appointments, and call your doctor if you are having problems. It's also a good idea to know your test results and keep alist of the medicines you take. Where can you learn more? Go to https://Comr.sezafraVeedMe.Training Intelligence. org and sign in to your BULX account. Enter P255 in the Clear[employer] Power box to learn more about \"Learning About Pseudogout. \"     If you do not have an account, please click on the \"Sign Up Now\" link. Current as of: December 20, 2021               Content Version: 13.2  © 5879-7986 Healthwise, Incorporated. Care instructions adapted under license by [facility] ([facility]).  If you have questions about a medical condition or this instruction, always ask your healthcare professional. Michael Browne disclaims any warranty or liability for your use of this information. Patient Education        Purine-Restricted Diet: Care Instructions  Your Care Instructions     Purines are substances that are found in some foods. Your body turns purines into uric acid. High levels of uric acid can cause gout, which is a form ofarthritis that causes pain and inflammation in joints. You may be able to help control the amount of uric acid in your body bylimiting high-purine foods in your diet. Follow-up care is a key part of your treatment and safety. Be sure to make and go to all appointments, and call your doctor if you are having problems. It's also a good idea to know your test results and keep alist of the medicines you take. How can you care for yourself at home?  Plan your meals and snacks around foods that are low in purines and are safe for you to eat. These foods include:  ? Green vegetables and tomatoes. ? Fruits. ? Whole-grain breads, rice, and cereals. ? Eggs, peanut butter, and nuts. ? Low-fat milk, cheese, and other milk products. ? Popcorn. ? Gelatin desserts, chocolate, cocoa, and cakes and sweets, in small amounts.  You can eat certain foods that are medium-high in purines, but eat them only once in a while. These foods include:  ? Legumes, such as dried beans and dried peas. You can have 1 cup cooked legumes each day. ? Asparagus, cauliflower, spinach, mushrooms, and green peas. ? Fish and seafood (other than very high-purine seafood). ? Oatmeal, wheat bran, and wheat germ.  Limit very high-purine foods, including:  ? Organ meats, such as liver, kidneys, sweetbreads, and brains. ? Meats, including reddy, beef, pork, and lamb. ? Game meats and any other meats in large amounts. ? Anchovies, sardines, herring, mackerel, and scallops. ? Gravy. ? Beer. Where can you learn more? Go to https://jerrica.Daric. org and sign in to your Infobright account.  Enter F448 in the [facility] box to serving provides 415 mg of calcium. ? Cheddar cheese. A 1½-ounce serving provides 306 mg.  ? Milk (skim, 2%, or whole). A 1-cup serving provides about 300 mg.  ? Cottage cheese (1% milk fat). A 1-cup serving provides 138 mg.  · If you can't eat or drink dairy foods, you can get calcium and vitamin D from:  ? Calcium-fortified orange juice. A 1-cup serving provides 500 mg of calcium. ? Calcium-enriched soy milk. A 1-cup serving provides 282 mg of calcium. ? Almonds. A 1-ounce serving (about 24 nuts) provides 75 mg of calcium. ? Canned salmon. A 3-ounce serving provides 180 mg of calcium. ? Tofu (firm, made with calcium sulfate). A ½-cup serving provides 204 mg. · You may need to take a calcium supplement to make sure you are getting the calcium you need. Where can you learn more? Go to https://PF Management Services.Celotor. org and sign in to your Soma Networks account. Enter Q201 in the [facility] box to learn more about \"Rheumatoid Arthritis (RA) Diet: Care Instructions. \"     If you do not have an account, please click on the \"Sign Up Now\" link. Current as of: September 8, 2021               Content Version: 13.1  © 9147-8905 Healthwise, Incorporated. Care instructions adapted under license by [facility] ([facility]). If you have questions about a medical condition or this instruction, always ask your healthcare professional. [place] [address] any warranty or liability for your use of this information.

## 2022-06-14 NOTE — PROGRESS NOTES
[address] [facility]  Dept: [phone]          Imani Love (:  1950) is a 66 y.o. male,Established patient, here for evaluation of the following chief complaint(s):  New Patient (Establish Care)      ASSESSMENT/PLAN:  I have reviewed the patient's medical history in detail and updated the computerized patient record. HPI/ROS per the patient and caregiver. Overall non toxic in appearance. Answers questions appropriately. Conditions discussed and addressed this visit include:     Overall pt doing well  Refill allopurinol. Labs in 4 months  See back before end of year an dprn for gout, etc  1. Chronic gouty arthritis  -     allopurinol (ZYLOPRIM) 100 MG tablet; Take 1 tablet by mouth daily, Disp-90 tablet, R-1Normal  -     Uric Acid; Future  -     Comprehensive Metabolic Panel; Future      Return in about 6 months (around 2022), or if symptoms worsen or fail to improve, for Results review, Routine follow up, Medication refill, Medication evaluation. SUBJECTIVE/OBJECTIVE:  HPI   Here for follow up on his gout   Was seeing Olivia Dai Chronic gout   Pt had cancelled his cardiac studies   Appears he has Medicare, no secondary   Is having to pay 20% of his bills   Needs refills of his gout medication   Gout symptoms are controlled at this time.  Ankles do have some swelling but pain is controlled  Review of Systems   Constitutional: Negative for activity change, appetite change, fatigue and unexpected weight change. HENT: Negative for ear discharge, ear pain, nosebleeds and tinnitus. Eyes: Negative for pain and redness. Respiratory: Negative for cough, chest tightness, shortness of breath and wheezing. Cardiovascular: Negative for chest pain. Gastrointestinal: Negative for abdominal distention, abdominal pain, constipation and diarrhea. Endocrine: Negative for cold intolerance and heat intolerance.    Genitourinary: Negative for difficulty urinating and dysuria. Musculoskeletal: Positive for arthralgias, joint swelling and myalgias. Skin: Negative for pallor and rash. Allergic/Immunologic: Negative for environmental allergies, food allergies and immunocompromised state. Neurological: Negative for dizziness, weakness, light-headedness and numbness. Hematological: Negative for adenopathy. Does not bruise/bleed easily. Psychiatric/Behavioral: Negative for behavioral problems and decreased concentration. The patient is not nervous/anxious. All other systems reviewed and are negative. Physical Exam  Vitals and nursing note reviewed. Constitutional:       Appearance: Normal appearance. He is well-developed and normal weight. HENT:      Head: Normocephalic and atraumatic. Right Ear: External ear normal.      Left Ear: External ear normal.      Nose: Nose normal.      Mouth/Throat:      Mouth: Mucous membranes are moist.      Pharynx: No oropharyngeal exudate. Eyes:      General:         Right eye: No discharge. Left eye: No discharge. Conjunctiva/sclera: Conjunctivae normal.   Neck:      Thyroid: No thyromegaly. Cardiovascular:      Rate and Rhythm: Normal rate and regular rhythm. Pulses: Normal pulses. Heart sounds: Normal heart sounds. Pulmonary:      Effort: Pulmonary effort is normal. No respiratory distress. Breath sounds: Normal breath sounds. No wheezing or rales. Chest:      Chest wall: No tenderness. Abdominal:      General: Bowel sounds are normal. There is no distension. Palpations: Abdomen is soft. Tenderness: There is no abdominal tenderness. Musculoskeletal:         General: Swelling present. No tenderness ( mild bilateral ankle edema. ). Normal range of motion. Cervical back: Normal range of motion and neck supple. No muscular tenderness. Lymphadenopathy:      Cervical: No cervical adenopathy. Skin:     General: Skin is warm and dry.       Capillary Refill: Capillary refill takes less than 2 seconds. Coloration: Skin is not pale. Findings: No erythema or rash. Neurological:      General: No focal deficit present. Mental Status: He is alert and oriented to person, place, and time. Mental status is at baseline. Cranial Nerves: No cranial nerve deficit. Motor: No abnormal muscle tone. Coordination: Coordination normal.      Deep Tendon Reflexes: Reflexes are normal and symmetric. Reflexes normal.   Psychiatric:         Behavior: Behavior normal.         Thought Content: Thought content normal.         Judgment: Judgment normal.                 An electronic signature was used to authenticate this note.     --ISAIAH Paris - CNP

## 2022-10-10 ENCOUNTER — TELEPHONE (OUTPATIENT)
Dept: FAMILY MEDICINE CLINIC | Age: 72
End: 2022-10-10

## 2022-10-10 NOTE — TELEPHONE ENCOUNTER
Please do not wait until Wednesday for evaluation. Please have him evaluated at ED d/t worsening hematochezia.

## 2022-10-10 NOTE — TELEPHONE ENCOUNTER
Patient's son Bryant Lazaro called stating the patient has blood in his stools, stated he has had this going on for some time now but it seems to have gotten worse.  Son made appointment for patient with [facility] for Wednesday October 12, 2022 but son said he's worried about the patient being dehydrated and may take the patient to an urgent care or to [facility].

## 2022-10-11 NOTE — TELEPHONE ENCOUNTER
I called and spoke to the patient's emergency contact, Cezar Tamez. I let him know Rebecca's response and asked him if he wanted us to keep the patient's appointment for tomorrow or cancel it. He said that he is going to call the patient and let us know.

## 2022-10-11 NOTE — TELEPHONE ENCOUNTER
I called and spoke to Rosario Sanchez. I asked him if the patient was taken to the hospital and he said no and that he is going to be evaluated in the office.

## 2022-10-12 ENCOUNTER — APPOINTMENT (OUTPATIENT)
Dept: CT IMAGING | Age: 72
End: 2022-10-12
Payer: MEDICARE

## 2022-10-12 ENCOUNTER — HOSPITAL ENCOUNTER (EMERGENCY)
Age: 72
Discharge: HOME | End: 2022-10-12

## 2022-10-12 ENCOUNTER — HOSPITAL ENCOUNTER (EMERGENCY)
Age: 72
Discharge: HOME | End: 2022-10-12
Attending: EMERGENCY MEDICINE
Payer: MEDICARE

## 2022-10-12 VITALS
SYSTOLIC BLOOD PRESSURE: 121 MMHG | BODY MASS INDEX: 25.05 KG/M2 | OXYGEN SATURATION: 98 % | RESPIRATION RATE: 18 BRPM | DIASTOLIC BLOOD PRESSURE: 70 MMHG | HEIGHT: 70 IN | WEIGHT: 175 LBS | HEART RATE: 52 BPM | TEMPERATURE: 98 F

## 2022-10-12 DIAGNOSIS — U07.1 COVID: Primary | ICD-10-CM

## 2022-10-12 DIAGNOSIS — K59.00 CONSTIPATION, UNSPECIFIED CONSTIPATION TYPE: ICD-10-CM

## 2022-10-12 LAB
ALBUMIN SERPL-MCNC: 3.7 G/DL (ref 3.5–5.1)
ALP BLD-CCNC: 93 U/L (ref 38–126)
ALT SERPL-CCNC: 11 U/L (ref 11–66)
ANION GAP SERPL CALCULATED.3IONS-SCNC: 12 MEQ/L (ref 8–16)
AST SERPL-CCNC: 18 U/L (ref 5–40)
BASOPHILS # BLD: 1 %
BASOPHILS ABSOLUTE: 0.1 THOU/MM3 (ref 0–0.1)
BILIRUB SERPL-MCNC: 0.8 MG/DL (ref 0.3–1.2)
BILIRUBIN DIRECT: < 0.2 MG/DL (ref 0–0.3)
BILIRUBIN URINE: NEGATIVE
BLOOD, URINE: NEGATIVE
BUN BLDV-MCNC: 9 MG/DL (ref 7–22)
CALCIUM SERPL-MCNC: 9.2 MG/DL (ref 8.5–10.5)
CHARACTER, URINE: CLEAR
CHLORIDE BLD-SCNC: 105 MEQ/L (ref 98–111)
CO2: 24 MEQ/L (ref 23–33)
COLOR: YELLOW
CREAT SERPL-MCNC: 0.8 MG/DL (ref 0.4–1.2)
EOSINOPHIL # BLD: 1.8 %
EOSINOPHILS ABSOLUTE: 0.1 THOU/MM3 (ref 0–0.4)
ERYTHROCYTE [DISTWIDTH] IN BLOOD BY AUTOMATED COUNT: 12.3 % (ref 11.5–14.5)
ERYTHROCYTE [DISTWIDTH] IN BLOOD BY AUTOMATED COUNT: 39.3 FL (ref 35–45)
ETHYL ALCOHOL, SERUM: < 0.01 %
GFR SERPL CREATININE-BSD FRML MDRD: > 90 ML/MIN/1.73M2
GLUCOSE BLD-MCNC: 81 MG/DL (ref 70–108)
GLUCOSE URINE: NEGATIVE MG/DL
HCT VFR BLD CALC: 38.8 % (ref 42–52)
HEMOGLOBIN: 13.9 GM/DL (ref 14–18)
IMMATURE GRANS (ABS): 0.05 THOU/MM3 (ref 0–0.07)
IMMATURE GRANULOCYTES: 1 %
KETONES, URINE: NEGATIVE
LEUKOCYTE ESTERASE, URINE: NEGATIVE
LIPASE: 21.5 U/L (ref 5.6–51.3)
LYMPHOCYTES # BLD: 26.4 %
LYMPHOCYTES ABSOLUTE: 1.3 THOU/MM3 (ref 1–4.8)
MAGNESIUM: 1.9 MG/DL (ref 1.6–2.4)
MCH RBC QN AUTO: 31.2 PG (ref 26–33)
MCHC RBC AUTO-ENTMCNC: 35.8 GM/DL (ref 32.2–35.5)
MCV RBC AUTO: 87 FL (ref 80–94)
MONOCYTES # BLD: 11.7 %
MONOCYTES ABSOLUTE: 0.6 THOU/MM3 (ref 0.4–1.3)
NITRITE, URINE: NEGATIVE
NUCLEATED RED BLOOD CELLS: 0 /100 WBC
OSMOLALITY CALCULATION: 279 MOSMOL/KG (ref 275–300)
PH UA: 6 (ref 5–9)
PLATELET # BLD: 252 THOU/MM3 (ref 130–400)
PMV BLD AUTO: 10.1 FL (ref 9.4–12.4)
POTASSIUM SERPL-SCNC: 3.8 MEQ/L (ref 3.5–5.2)
PRO-BNP: 221.4 PG/ML (ref 0–900)
PROTEIN UA: NEGATIVE
RBC # BLD: 4.46 MILL/MM3 (ref 4.7–6.1)
SARS-COV-2, NAAT: DETECTED
SEG NEUTROPHILS: 58.1 %
SEGMENTED NEUTROPHILS ABSOLUTE COUNT: 2.9 THOU/MM3 (ref 1.8–7.7)
SODIUM BLD-SCNC: 141 MEQ/L (ref 135–145)
SPECIFIC GRAVITY, URINE: <= 1.005 (ref 1–1.03)
TOTAL PROTEIN: 6.4 G/DL (ref 6.1–8)
TROPONIN T: < 0.01 NG/ML
UROBILINOGEN, URINE: 0.2 EU/DL (ref 0–1)
WBC # BLD: 5 THOU/MM3 (ref 4.8–10.8)

## 2022-10-12 PROCEDURE — 84484 ASSAY OF TROPONIN QUANT: CPT

## 2022-10-12 PROCEDURE — 80053 COMPREHEN METABOLIC PANEL: CPT

## 2022-10-12 PROCEDURE — 87635 SARS-COV-2 COVID-19 AMP PRB: CPT

## 2022-10-12 PROCEDURE — 83690 ASSAY OF LIPASE: CPT

## 2022-10-12 PROCEDURE — 83735 ASSAY OF MAGNESIUM: CPT

## 2022-10-12 PROCEDURE — 99285 EMERGENCY DEPT VISIT HI MDM: CPT

## 2022-10-12 PROCEDURE — 74177 CT ABD & PELVIS W/CONTRAST: CPT

## 2022-10-12 PROCEDURE — 6370000000 HC RX 637 (ALT 250 FOR IP): Performed by: EMERGENCY MEDICINE

## 2022-10-12 PROCEDURE — 82077 ASSAY SPEC XCP UR&BREATH IA: CPT

## 2022-10-12 PROCEDURE — 81003 URINALYSIS AUTO W/O SCOPE: CPT

## 2022-10-12 PROCEDURE — 83880 ASSAY OF NATRIURETIC PEPTIDE: CPT

## 2022-10-12 PROCEDURE — 82248 BILIRUBIN DIRECT: CPT

## 2022-10-12 PROCEDURE — 85025 COMPLETE CBC W/AUTO DIFF WBC: CPT

## 2022-10-12 PROCEDURE — 6360000004 HC RX CONTRAST MEDICATION: Performed by: EMERGENCY MEDICINE

## 2022-10-12 RX ORDER — LACTULOSE 10 G/15ML
20 SOLUTION ORAL ONCE
Status: COMPLETED | OUTPATIENT
Start: 2022-10-12 | End: 2022-10-12

## 2022-10-12 RX ORDER — POLYETHYLENE GLYCOL 3350 17 G/17G
17 POWDER, FOR SOLUTION ORAL DAILY
Qty: 255 G | Refills: 1 | Status: SHIPPED | OUTPATIENT
Start: 2022-10-12 | End: 2022-10-19 | Stop reason: SDUPTHER

## 2022-10-12 RX ORDER — PREDNISONE 20 MG/1
20 TABLET ORAL DAILY
Qty: 10 TABLET | Refills: 0 | Status: SHIPPED | OUTPATIENT
Start: 2022-10-12 | End: 2022-10-22

## 2022-10-12 RX ADMIN — LACTULOSE 20 G: 20 SOLUTION ORAL at 13:33

## 2022-10-12 RX ADMIN — IOPAMIDOL 80 ML: 755 INJECTION, SOLUTION INTRAVENOUS at 12:34

## 2022-10-12 ASSESSMENT — PAIN SCALES - GENERAL
PAINLEVEL_OUTOF10: 8
PAINLEVEL_OUTOF10: 8

## 2022-10-12 ASSESSMENT — ENCOUNTER SYMPTOMS
VOMITING: 0
SORE THROAT: 0
VOICE CHANGE: 0
CONSTIPATION: 1
SHORTNESS OF BREATH: 0
ABDOMINAL PAIN: 1
EYE PAIN: 0
BACK PAIN: 0
EYE ITCHING: 0
EYE DISCHARGE: 0
RHINORRHEA: 0
BLOOD IN STOOL: 0
CHOKING: 0
DIARRHEA: 0
COUGH: 0
PHOTOPHOBIA: 0
WHEEZING: 0
ABDOMINAL DISTENTION: 1
EYE REDNESS: 0
TROUBLE SWALLOWING: 0
SINUS PRESSURE: 0
CHEST TIGHTNESS: 0
NAUSEA: 0

## 2022-10-12 ASSESSMENT — PAIN - FUNCTIONAL ASSESSMENT
PAIN_FUNCTIONAL_ASSESSMENT: 0-10
PAIN_FUNCTIONAL_ASSESSMENT: 0-10

## 2022-10-12 ASSESSMENT — PAIN DESCRIPTION - PAIN TYPE: TYPE: ACUTE PAIN

## 2022-10-12 ASSESSMENT — PAIN DESCRIPTION - LOCATION
LOCATION: ABDOMEN
LOCATION: ABDOMEN;FLANK

## 2022-10-12 ASSESSMENT — PAIN DESCRIPTION - ORIENTATION: ORIENTATION: RIGHT;LEFT;LOWER

## 2022-10-12 ASSESSMENT — PAIN DESCRIPTION - FREQUENCY: FREQUENCY: CONTINUOUS

## 2022-10-12 NOTE — ED NOTES
Pt presents to ED via triage for c/o lower abdominal pain x7-8 months that radiates to bilat kidneys. Pt reports not being able to continue and was advised to seek ED treatment. Upon initial assessment, pt is A&Ox4, resps easy and unlabored. Pt reports constipation as well x7-8 months with very small, intermittent BMs, last one being 2-3 days ago. Pt currently rating pain 8/10, denies taking any medications to help with symptoms. IV established with blood drawn and sent to lab. Pt swabbed for covid per orders. Dr Bill Delgadillo at bedside for assessment. Will monitor.      Bita Mitchell RN  10/12/22 [phone]

## 2022-10-12 NOTE — ED NOTES
Patient presents to the ED with complaints of having lower abdominal pain and bilateral flank pain that radiates down both legs.       Aguilar Simon LPN  80/87/98 [phone]

## 2022-10-12 NOTE — ED PROVIDER NOTES
[facility]  eMERGENCY dEPARTMENT eNCOUnter          [address] [facility]       Chief Complaint   Patient presents with    Abdominal Pain     lower    Flank Pain     Bilateral       Nurses Notes reviewed and I agree except as noted in the HPI. HISTORY OF PRESENT ILLNESS    Juanpablo Egan is a 74 y.o. male who presents for abdominal pain. Apparently there is been ongoing for about 6 to 7 months. Patient states that his bowel movements have been irregular and hard. Patient states he does have constipation. He is taking some homeopathic remedy for it. He has not taking any Dulcolax or MiraLAX for this. Patient denies any overt pain at this time. But he states that when he has pain its in his upper and lower belly. Is also diffusely throughout his abdomen. Patient denies any vomiting. He denies any chest pain or shortness of breath. Patient is otherwise resting comfortably on the cot no apparent distress no other physical complaints at this time. REVIEW OF SYSTEMS     Review of Systems   Constitutional:  Negative for activity change, appetite change, diaphoresis, fatigue and unexpected weight change. HENT:  Negative for congestion, ear discharge, ear pain, hearing loss, rhinorrhea, sinus pressure, sore throat, trouble swallowing and voice change. Eyes:  Negative for photophobia, pain, discharge, redness and itching. Respiratory:  Negative for cough, choking, chest tightness, shortness of breath and wheezing. Cardiovascular:  Negative for chest pain, palpitations and leg swelling. Gastrointestinal:  Positive for abdominal distention, abdominal pain and constipation. Negative for blood in stool, diarrhea, nausea and vomiting. Endocrine: Negative for polydipsia, polyphagia and polyuria. Genitourinary:  Negative for decreased urine volume, difficulty urinating, dysuria, enuresis, frequency, hematuria and urgency.    Musculoskeletal:  Negative for arthralgias, back pain, gait problem, myalgias, neck pain and neck stiffness. Skin:  Negative for pallor and rash. Allergic/Immunologic: Negative for immunocompromised state. Neurological:  Negative for dizziness, tremors, seizures, syncope, facial asymmetry, weakness, light-headedness, numbness and headaches. Hematological:  Negative for adenopathy. Does not bruise/bleed easily. Psychiatric/Behavioral:  Negative for agitation, hallucinations and suicidal ideas. The patient is not nervous/anxious. PAST MEDICAL HISTORY    has a past medical history of Gout, Hypertension, Kidney stone, and Pneumothorax on right. SURGICAL HISTORY      has a past surgical history that includes Kidney surgery and shoulder surgery (Left). CURRENT MEDICATIONS       Discharge Medication List as of 10/12/2022  1:21 PM        CONTINUE these medications which have NOT CHANGED    Details   Cyanocobalamin (VITAMIN B-12 PO) Take by mouth dailyHistorical Med      Niacin (VITAMIN B-3 PO) Take by mouth dailyHistorical Med      VITAMIN D PO Take by mouth dailyHistorical Med      Multiple Vitamin (MULTIVITAMIN PO) Take by mouth dailyHistorical Med      allopurinol (ZYLOPRIM) 100 MG tablet Take 1 tablet by mouth daily, Disp-90 tablet, R-1Normal      furosemide (LASIX) 20 MG tablet Take 1 tablet by mouth every 48 hours, Disp-90 tablet, R-1Normal      colchicine (COLCRYS) 0.6 MG tablet Take one tab daily until gout flare up resolves. , Disp-15 tablet, R-0Print             ALLERGIES     has No Known Allergies. FAMILY HISTORY     He indicated that the status of his mother is unknown. He indicated that the status of his father is unknown.   family history includes Cancer in his father; Other in his mother. SOCIAL HISTORY      reports that he quit smoking about 35 years ago. His smoking use included cigarettes. He has never used smokeless tobacco. He reports that he does not drink alcohol and does not use drugs.     PHYSICAL EXAM     INITIAL VITALS: height is 5' 10\" (1.778 m) and weight is 175 lb (79.4 kg). His oral temperature is 98 °F (36.7 °C). His blood pressure is 121/70 and his pulse is 52. His respiration is 18 and oxygen saturation is 98%. Physical Exam  Vitals and nursing note reviewed. Constitutional:       General: He is not in acute distress. Appearance: He is well-developed. He is not diaphoretic. HENT:      Head: Normocephalic and atraumatic. Right Ear: External ear normal.      Left Ear: External ear normal.      Nose: Nose normal.   Eyes:      General: Lids are normal. No scleral icterus. Right eye: No discharge. Left eye: No discharge. Conjunctiva/sclera: Conjunctivae normal.      Right eye: No exudate. Left eye: No exudate. Pupils: Pupils are equal, round, and reactive to light. Neck:      Thyroid: No thyromegaly. Vascular: No JVD. Trachea: No tracheal deviation. Cardiovascular:      Rate and Rhythm: Normal rate and regular rhythm. Pulses: Normal pulses. Carotid pulses are 2+ on the right side and 2+ on the left side. Radial pulses are 2+ on the right side and 2+ on the left side. Femoral pulses are 2+ on the right side and 2+ on the left side. Popliteal pulses are 2+ on the right side and 2+ on the left side. Dorsalis pedis pulses are 2+ on the right side and 2+ on the left side. Posterior tibial pulses are 2+ on the right side and 2+ on the left side. Heart sounds: Normal heart sounds, S1 normal and S2 normal. No murmur heard. No friction rub. No gallop. Pulmonary:      Effort: Pulmonary effort is normal. No respiratory distress. Breath sounds: Normal breath sounds. No stridor. No decreased breath sounds, wheezing, rhonchi or rales. Chest:      Chest wall: No tenderness. Abdominal:      General: Bowel sounds are normal. There is no distension. Palpations: Abdomen is soft. There is no mass. Tenderness:  There is abdominal tenderness in the right lower quadrant, suprapubic area and left lower quadrant. There is no right CVA tenderness, left CVA tenderness, guarding or rebound. Negative signs include Barrera's sign, Rovsing's sign, McBurney's sign, psoas sign and obturator sign. Hernia: No hernia is present. Musculoskeletal:         General: No tenderness. Normal range of motion. Cervical back: Normal range of motion and neck supple. Normal range of motion. Right lower leg: No edema. Left lower leg: No edema. Lymphadenopathy:      Cervical: No cervical adenopathy. Skin:     General: Skin is warm and dry. Findings: No bruising, ecchymosis, lesion or rash. Neurological:      Mental Status: He is alert and oriented to person, place, and time. Cranial Nerves: No cranial nerve deficit. Sensory: No sensory deficit. Coordination: Coordination normal.      Deep Tendon Reflexes: Reflexes are normal and symmetric. Psychiatric:         Speech: Speech normal.         Behavior: Behavior normal.         Thought Content: Thought content normal.         Judgment: Judgment normal.         DIFFERENTIAL DIAGNOSIS:   Constipation, urinary tract infection less likely small bowel obstruction    DIAGNOSTIC RESULTS     EKG: All EKG's are interpreted by the Emergency Department Physician who either signs or Co-signs this chart in the absence of a cardiologist.  None    RADIOLOGY: non-plain film images(s) such as CT, Ultrasound and MRI are read by the radiologist.  CT ABDOMEN PELVIS W IV CONTRAST Additional Contrast? None   Final Result   1. Cholelithiasis. No CT evidence of acute cholecystitis. 2. Colonic diverticulosis. 3. Other findings as described above. **This report has been created using voice recognition software. It may contain minor errors which are inherent in voice recognition technology. **      Final report electronically signed by Dr Angelina Abrams on 10/12/2022 1:10 PM            LABS:   Labs Reviewed   COVID-19, RAPID - Abnormal; Notable for the following components:       Result Value    SARS-CoV-2, NAAT DETECTED (*)     All other components within normal limits   CBC WITH AUTO DIFFERENTIAL - Abnormal; Notable for the following components:    RBC 4.46 (*)     Hemoglobin 13.9 (*)     Hematocrit 38.8 (*)     MCHC 35.8 (*)     All other components within normal limits   BRAIN NATRIURETIC PEPTIDE   BASIC METABOLIC PANEL   HEPATIC FUNCTION PANEL   LIPASE   TROPONIN   MAGNESIUM   ETHANOL   URINALYSIS WITH REFLEX TO CULTURE   ANION GAP   GLOMERULAR FILTRATION RATE, ESTIMATED   OSMOLALITY       EMERGENCY DEPARTMENT COURSE:   Vitals:    Vitals:    10/12/22 1047 10/12/22 1108   BP: 132/70 121/70   Pulse: 55 52   Resp: 18 18   Temp: 98 °F (36.7 °C)    TempSrc: Oral    SpO2: 96% 98%   Weight: 175 lb (79.4 kg)    Height: 5' 10\" (1.778 m)      Patient was assessed at bedside labs and imaging were ordered. Patient states this has been ongoing for approximately 7 months. He has not done anything to help himself. He is here today to try to figure out what is going on. Physical examination today did not reveal any overt tenderness. Here today CT examination revealed constipation. No other acute intra-abdominal pathologies. Patient is also found to have COVID. Patient is instructed to use the MiraLAX as prescribed. He is instructed to use steroids for COVID he is also instructed to use over-the-counter cough cold and flu medications. Patient understood and agreed to the plan. Patient is subsequently discharged home in stable condition. Patient has what appears to be constipation. Patient has been given MiraLAX he is instructed to take it as prescribed. Patient is also incidentally found to have, COVID. He has been given steroids. He is instructed to use over-the-counter cough cold and flu medications. Instructed to use Tylenol Motrin for any pain.   Patient is instructed to follow-up with a primary care physician to do so within the next 1 to 2 days and return to the nearest emergency room immediately for any new or worsening complaints. CRITICAL CARE:   None    CONSULTS:  None    PROCEDURES:  None    FINAL IMPRESSION      1. COVID    2. Constipation, unspecified constipation type          DISPOSITION/PLAN   Discharge    PATIENT REFERRED TO:  No follow-up provider specified.     DISCHARGE MEDICATIONS:  Discharge Medication List as of 10/12/2022  1:21 PM        START taking these medications    Details   predniSONE (DELTASONE) 20 MG tablet Take 1 tablet by mouth daily for 10 days, Disp-10 tablet, R-0Print      polyethylene glycol (GLYCOLAX) 17 GM/SCOOP powder Take 17 g by mouth daily, Disp-255 g, R-1Print             (Please note that portions of this note were completed with a voice recognition program.  Efforts were made to edit the dictations but occasionally words are mis-transcribed.)    Bong Ferris, [address] [place] [address],   10/12/22 3222

## 2022-10-12 NOTE — DISCHARGE INSTRUCTIONS
Patient has what appears to be constipation. Patient has been given MiraLAX he is instructed to take it as prescribed. Patient is also incidentally found to have, COVID. He has been given steroids. He is instructed to use over-the-counter cough cold and flu medications. Instructed to use Tylenol Motrin for any pain. Patient is instructed to follow-up with a primary care physician to do so within the next 1 to 2 days and return to the nearest emergency room immediately for any new or worsening complaints.

## 2022-10-14 RX ORDER — SENNA PLUS 8.6 MG/1
1 TABLET ORAL 2 TIMES DAILY
Qty: 60 TABLET | Refills: 11 | Status: SHIPPED | OUTPATIENT
Start: 2022-10-14 | End: 2023-10-14

## 2022-10-14 NOTE — TELEPHONE ENCOUNTER
I called and spoke to the patient. I let him know Rebecca's response and that the medication was sent to [facility] in [facility].  The patient then scheduled a follow up to see Harshil Cordova in the office on Friday, 10- at 7:30 am.

## 2022-10-14 NOTE — TELEPHONE ENCOUNTER
Added senokot to take 1 tablet twice daily until stools return to normal. See in office next week for recheck.

## 2022-10-14 NOTE — TELEPHONE ENCOUNTER
Patient stopped in, stated he did go to the ED for the rectal bleeding, was diagnosed with COVID and constipation, was given Glycolax for the constipation but it is not helping at all and would like to know if there's a different medication that Asael Dickson can prescribe that may help?

## 2022-10-19 ENCOUNTER — OFFICE VISIT (OUTPATIENT)
Dept: FAMILY MEDICINE CLINIC | Age: 72
End: 2022-10-19
Payer: MEDICARE

## 2022-10-19 VITALS
WEIGHT: 184.8 LBS | HEART RATE: 62 BPM | TEMPERATURE: 97.9 F | SYSTOLIC BLOOD PRESSURE: 124 MMHG | BODY MASS INDEX: 28.01 KG/M2 | DIASTOLIC BLOOD PRESSURE: 80 MMHG | HEIGHT: 68 IN | OXYGEN SATURATION: 98 %

## 2022-10-19 DIAGNOSIS — K57.90 DIVERTICULOSIS: ICD-10-CM

## 2022-10-19 DIAGNOSIS — K62.5 RECTAL BLEEDING: ICD-10-CM

## 2022-10-19 DIAGNOSIS — U07.1 COVID: ICD-10-CM

## 2022-10-19 DIAGNOSIS — K59.00 CONSTIPATION, UNSPECIFIED CONSTIPATION TYPE: Primary | ICD-10-CM

## 2022-10-19 PROCEDURE — 99214 OFFICE O/P EST MOD 30 MIN: CPT | Performed by: NURSE PRACTITIONER

## 2022-10-19 PROCEDURE — 3017F COLORECTAL CA SCREEN DOC REV: CPT | Performed by: NURSE PRACTITIONER

## 2022-10-19 PROCEDURE — G8484 FLU IMMUNIZE NO ADMIN: HCPCS | Performed by: NURSE PRACTITIONER

## 2022-10-19 PROCEDURE — G8417 CALC BMI ABV UP PARAM F/U: HCPCS | Performed by: NURSE PRACTITIONER

## 2022-10-19 PROCEDURE — 1036F TOBACCO NON-USER: CPT | Performed by: NURSE PRACTITIONER

## 2022-10-19 PROCEDURE — G8427 DOCREV CUR MEDS BY ELIG CLIN: HCPCS | Performed by: NURSE PRACTITIONER

## 2022-10-19 PROCEDURE — 1123F ACP DISCUSS/DSCN MKR DOCD: CPT | Performed by: NURSE PRACTITIONER

## 2022-10-19 RX ORDER — AZITHROMYCIN 250 MG/1
250 TABLET, FILM COATED ORAL SEE ADMIN INSTRUCTIONS
Qty: 6 TABLET | Refills: 0 | Status: SHIPPED | OUTPATIENT
Start: 2022-10-19 | End: 2022-10-24 | Stop reason: ALTCHOICE

## 2022-10-19 RX ORDER — POLYETHYLENE GLYCOL 3350 17 G/17G
17 POWDER, FOR SOLUTION ORAL DAILY
Qty: 255 G | Refills: 1 | Status: SHIPPED | OUTPATIENT
Start: 2022-10-19 | End: 2022-11-18

## 2022-10-19 ASSESSMENT — ENCOUNTER SYMPTOMS
CONSTIPATION: 1
DIARRHEA: 0
ANAL BLEEDING: 1
EYE PAIN: 0
ABDOMINAL PAIN: 0
WHEEZING: 0
SHORTNESS OF BREATH: 0
RECTAL PAIN: 1
ABDOMINAL DISTENTION: 0
EYE REDNESS: 0
CHEST TIGHTNESS: 0
COUGH: 1

## 2022-10-19 NOTE — PROGRESS NOTES
[address] [facility]  Dept: [phone]          Rolly Umana (:  1950) is a 74 y.o. male,Established patient, here for evaluation of the following chief complaint(s):  Post-COVID Symptoms (Was positive for Covid last week.)      ASSESSMENT/PLAN:  I have reviewed the patient's medical history in detail and updated the computerized patient record. HPI/ROS per the patient and caregiver. Overall non toxic in appearance. Answers questions appropriately. Conditions discussed and addressed this visit include:   Overall improved  Covid sypmptoms were mild however with coarse rhonchi throughout, and purulent sputum will cover with zpack. Miralax 0.5-1 cup daily  Follow up with GI  Diverticular diet provided on discharge. Continue to push fluids. Follow up on 4 weeks once seen by GI. 1. Constipation, unspecified constipation type  -     CINDY - Shelly Cabrera MD, Gastroenterology, [place]  2. COVID  -     azithromycin (ZITHROMAX) 250 MG tablet; Take 1 tablet by mouth See Admin Instructions for 5 days 500mg on day 1 followed by 250mg on days 2 - 5, Disp-6 tablet, R-0Normal  3. Rectal bleeding  -     CINDY - Shelly Cabrera MD, Gastroenterology, [place] [place]  4. Diverticulosis    Return in about 4 weeks (around 2022) for Medication evaluation, Results review, Routine follow up. SUBJECTIVE/OBJECTIVE:  HPI  Here for rectal bleeding follow up  Had giovanny rectal bleeding  Found to have constipation and covid on ER visit  Diverticulosis on CT scan  Gall stones  Still having some blood  Movements are easier  Coughing up large amounts of phlegm, brown in color. Review of Systems   Constitutional:  Positive for activity change, appetite change and fatigue. Negative for unexpected weight change. HENT:  Negative for ear discharge, ear pain, nosebleeds and tinnitus. Eyes:  Negative for pain and redness. Respiratory:  Positive for cough.  Negative for chest tightness, shortness of breath and wheezing. Cardiovascular:  Negative for chest pain. Gastrointestinal:  Positive for anal bleeding, constipation and rectal pain. Negative for abdominal distention, abdominal pain and diarrhea. Endocrine: Negative for cold intolerance and heat intolerance. Genitourinary:  Negative for difficulty urinating and dysuria. Musculoskeletal:  Positive for arthralgias. Negative for joint swelling and myalgias. Skin:  Negative for pallor and rash. Allergic/Immunologic: Negative for environmental allergies, food allergies and immunocompromised state. Neurological:  Negative for dizziness, weakness, light-headedness and numbness. Hematological:  Negative for adenopathy. Does not bruise/bleed easily. Psychiatric/Behavioral:  Negative for behavioral problems and decreased concentration. The patient is not nervous/anxious. All other systems reviewed and are negative. Physical Exam  Vitals and nursing note reviewed. Constitutional:       Appearance: Normal appearance. He is well-developed and normal weight. HENT:      Head: Normocephalic and atraumatic. Right Ear: External ear normal.      Left Ear: External ear normal.      Nose: Nose normal.      Mouth/Throat:      Mouth: Mucous membranes are moist.      Pharynx: No oropharyngeal exudate. Eyes:      General:         Right eye: No discharge. Left eye: No discharge. Conjunctiva/sclera: Conjunctivae normal.   Neck:      Thyroid: No thyromegaly. Cardiovascular:      Rate and Rhythm: Normal rate and regular rhythm. Pulses: Normal pulses. Heart sounds: Normal heart sounds. Pulmonary:      Effort: Pulmonary effort is normal. No respiratory distress. Breath sounds: Normal breath sounds. No wheezing or rales. Rhonchi: diffuse throughout. Comments: Moist cough. Chest:      Chest wall: No tenderness. Abdominal:      General: Bowel sounds are normal. There is no distension. Palpations: Abdomen is soft. Tenderness: There is no abdominal tenderness. There is no guarding. Musculoskeletal:         General: Swelling present. No tenderness ( mild bilateral ankle edema. ). Normal range of motion. Cervical back: Normal range of motion and neck supple. No tenderness. No muscular tenderness. Lymphadenopathy:      Cervical: No cervical adenopathy. Skin:     General: Skin is warm and dry. Capillary Refill: Capillary refill takes less than 2 seconds. Coloration: Skin is not pale. Findings: No erythema or rash. Neurological:      General: No focal deficit present. Mental Status: He is alert and oriented to person, place, and time. Mental status is at baseline. Cranial Nerves: No cranial nerve deficit. Motor: No abnormal muscle tone. Coordination: Coordination normal.      Deep Tendon Reflexes: Reflexes are normal and symmetric. Reflexes normal.   Psychiatric:         Behavior: Behavior normal.         Thought Content: Thought content normal.         Judgment: Judgment normal.               An electronic signature was used to authenticate this note.     --Felipe Jules, ISAIAH - CNP

## 2022-10-24 ENCOUNTER — HOSPITAL ENCOUNTER (OUTPATIENT)
Dept: GENERAL RADIOLOGY | Age: 72
Discharge: HOME | End: 2022-10-24
Payer: MEDICARE

## 2022-10-24 ENCOUNTER — HOSPITAL ENCOUNTER (OUTPATIENT)
Age: 72
Discharge: HOME | End: 2022-10-24
Payer: MEDICARE

## 2022-10-24 ENCOUNTER — TELEPHONE (OUTPATIENT)
Dept: FAMILY MEDICINE CLINIC | Age: 72
End: 2022-10-24

## 2022-10-24 ENCOUNTER — OFFICE VISIT (OUTPATIENT)
Dept: FAMILY MEDICINE CLINIC | Age: 72
End: 2022-10-24
Payer: MEDICARE

## 2022-10-24 VITALS
TEMPERATURE: 98 F | DIASTOLIC BLOOD PRESSURE: 78 MMHG | BODY MASS INDEX: 27.43 KG/M2 | WEIGHT: 182 LBS | HEART RATE: 70 BPM | OXYGEN SATURATION: 98 % | SYSTOLIC BLOOD PRESSURE: 118 MMHG

## 2022-10-24 DIAGNOSIS — U07.1 COVID: ICD-10-CM

## 2022-10-24 DIAGNOSIS — R07.9 CHEST PAIN, UNSPECIFIED TYPE: Primary | ICD-10-CM

## 2022-10-24 DIAGNOSIS — R07.9 CHEST PAIN, UNSPECIFIED TYPE: ICD-10-CM

## 2022-10-24 PROCEDURE — 1036F TOBACCO NON-USER: CPT | Performed by: NURSE PRACTITIONER

## 2022-10-24 PROCEDURE — G8484 FLU IMMUNIZE NO ADMIN: HCPCS | Performed by: NURSE PRACTITIONER

## 2022-10-24 PROCEDURE — G8417 CALC BMI ABV UP PARAM F/U: HCPCS | Performed by: NURSE PRACTITIONER

## 2022-10-24 PROCEDURE — G8427 DOCREV CUR MEDS BY ELIG CLIN: HCPCS | Performed by: NURSE PRACTITIONER

## 2022-10-24 PROCEDURE — 1123F ACP DISCUSS/DSCN MKR DOCD: CPT | Performed by: NURSE PRACTITIONER

## 2022-10-24 PROCEDURE — 99213 OFFICE O/P EST LOW 20 MIN: CPT | Performed by: NURSE PRACTITIONER

## 2022-10-24 PROCEDURE — 71046 X-RAY EXAM CHEST 2 VIEWS: CPT

## 2022-10-24 PROCEDURE — 3017F COLORECTAL CA SCREEN DOC REV: CPT | Performed by: NURSE PRACTITIONER

## 2022-10-24 RX ORDER — GUAIFENESIN 600 MG/1
1200 TABLET, EXTENDED RELEASE ORAL 2 TIMES DAILY
Qty: 40 TABLET | Refills: 0 | Status: SHIPPED | OUTPATIENT
Start: 2022-10-24 | End: 2022-11-03

## 2022-10-24 RX ORDER — PREDNISONE 20 MG/1
20 TABLET ORAL 2 TIMES DAILY
Qty: 10 TABLET | Refills: 0 | Status: SHIPPED | OUTPATIENT
Start: 2022-10-24 | End: 2022-10-29

## 2022-10-24 ASSESSMENT — ENCOUNTER SYMPTOMS
SHORTNESS OF BREATH: 0
CHEST TIGHTNESS: 1
COUGH: 1
WHEEZING: 0
GASTROINTESTINAL NEGATIVE: 1

## 2022-10-24 NOTE — TELEPHONE ENCOUNTER
I called the patient's Other Phone and it was a business. I asked for him and was told that he is not there and if he could take a message and I said no thank you.

## 2022-10-24 NOTE — TELEPHONE ENCOUNTER
Patient was seen in the office today, Tacho Byrne was supposed to call him with his xray results this afternoon but his phone stopped working and stopped by to receive his results! Patient said he will call the office for those results later this afternoon!

## 2022-10-24 NOTE — TELEPHONE ENCOUNTER
----- Message from ISAIAH Hampton CNP sent at 10/24/2022  1:21 PM EDT -----  Xray of lungs does not show definite pneumonia. Will order a round of steroids to relieve the inflammation and pain. I also ordered mucinex to take for 10 days to help expel any chest congestion. Follow up in 10 days if no improvement.

## 2022-10-24 NOTE — PROGRESS NOTES
[address]  Dept: [phone] [facility] (:  1950) is a 74 y.o. male,Established patient, here for evaluation of the following chief complaint(s):  Flank Pain (left side started on Friday, Saturday,  but better today but wanted to get checked.)      ASSESSMENT/PLAN:  I have reviewed the patient's medical history in detail and updated the computerized patient record. HPI/ROS per the patient and caregiver. Overall non toxic in appearance. Answers questions appropriately. Conditions discussed and addressed this visit include:   Xray shows no definitive pneumonia will cover with steroids and mucinex. Follow up in 10 days if no improvement  Go to ER if condition deteriorates  Increase fluid intake. 1. Chest pain, unspecified type  -     XR CHEST STANDARD (2 VW); Future  -     predniSONE (DELTASONE) 20 MG tablet; Take 1 tablet by mouth 2 times daily for 5 days, Disp-10 tablet, R-0Normal  -     guaiFENesin (MUCINEX) 600 MG extended release tablet; Take 2 tablets by mouth 2 times daily for 10 days, Disp-40 tablet, R-0Normal  2. COVID  -     XR CHEST STANDARD (2 VW); Future  -     predniSONE (DELTASONE) 20 MG tablet; Take 1 tablet by mouth 2 times daily for 5 days, Disp-10 tablet, R-0Normal  -     guaiFENesin (MUCINEX) 600 MG extended release tablet; Take 2 tablets by mouth 2 times daily for 10 days, Disp-40 tablet, R-0Normal    Return if symptoms worsen or fail to improve. SUBJECTIVE/OBJECTIVE:  HPI  Here for follow up on covid  Having left sided rib and chest pain over the weekend  Is drinking water  no gatorade  No sob  Coughing but no phlegm production. Felt fatigued over the weekend. afebrile   Review of Systems   Constitutional:  Positive for activity change and appetite change. Respiratory:  Positive for cough and chest tightness. Negative for shortness of breath and wheezing. Cardiovascular:  Positive for chest pain.  Negative for palpitations and leg swelling. Gastrointestinal: Negative. Endocrine: Negative for cold intolerance and heat intolerance. Genitourinary: Negative. Musculoskeletal:  Negative for arthralgias and myalgias. Skin: Negative. Neurological:  Negative for dizziness, light-headedness and headaches. Hematological:  Negative for adenopathy. Does not bruise/bleed easily. Psychiatric/Behavioral: Negative. Physical Exam  Vitals and nursing note reviewed. Constitutional:       Appearance: Normal appearance. He is well-developed and normal weight. HENT:      Head: Normocephalic and atraumatic. Right Ear: External ear normal.      Left Ear: External ear normal.      Nose: Nose normal.      Mouth/Throat:      Mouth: Mucous membranes are moist.      Pharynx: No oropharyngeal exudate. Eyes:      General:         Right eye: No discharge. Left eye: No discharge. Conjunctiva/sclera: Conjunctivae normal.   Neck:      Thyroid: No thyromegaly. Cardiovascular:      Rate and Rhythm: Normal rate and regular rhythm. Pulses: Normal pulses. Heart sounds: Normal heart sounds. Pulmonary:      Effort: Pulmonary effort is normal. No respiratory distress. Breath sounds: Rhonchi (left middle and upper lobe. Diminished in the bases. ) present. No wheezing or rales. Chest:      Chest wall: No tenderness. Abdominal:      General: Bowel sounds are normal. There is no distension. Palpations: Abdomen is soft. Tenderness: There is no abdominal tenderness. Musculoskeletal:         General: No tenderness. Normal range of motion. Cervical back: Normal range of motion and neck supple. No muscular tenderness. Lymphadenopathy:      Cervical: No cervical adenopathy. Skin:     General: Skin is warm and dry. Capillary Refill: Capillary refill takes less than 2 seconds. Coloration: Skin is not pale. Findings: No erythema or rash.    Neurological:      General: No focal deficit present. Mental Status: He is alert and oriented to person, place, and time. Mental status is at baseline. Cranial Nerves: No cranial nerve deficit. Motor: No abnormal muscle tone. Coordination: Coordination normal.      Deep Tendon Reflexes: Reflexes are normal and symmetric. Reflexes normal.   Psychiatric:         Behavior: Behavior normal.         Thought Content: Thought content normal.         Judgment: Judgment normal.               An electronic signature was used to authenticate this note.     --ISAIAH Cantu - CNP

## 2023-01-30 ENCOUNTER — OFFICE VISIT (OUTPATIENT)
Dept: FAMILY MEDICINE CLINIC | Age: 73
End: 2023-01-30
Payer: MEDICARE

## 2023-01-30 VITALS
SYSTOLIC BLOOD PRESSURE: 120 MMHG | BODY MASS INDEX: 29.86 KG/M2 | TEMPERATURE: 97.8 F | DIASTOLIC BLOOD PRESSURE: 82 MMHG | HEIGHT: 68 IN | HEART RATE: 56 BPM | OXYGEN SATURATION: 98 % | WEIGHT: 197 LBS

## 2023-01-30 DIAGNOSIS — M1A.00X0 CHRONIC GOUTY ARTHRITIS: ICD-10-CM

## 2023-01-30 DIAGNOSIS — I20.8 OTHER FORMS OF ANGINA PECTORIS (HCC): ICD-10-CM

## 2023-01-30 DIAGNOSIS — Z00.00 ENCOUNTER FOR SUBSEQUENT ANNUAL WELLNESS VISIT (AWV) IN MEDICARE PATIENT: Primary | ICD-10-CM

## 2023-01-30 PROBLEM — I20.89 OTHER FORMS OF ANGINA PECTORIS (HCC): Status: ACTIVE | Noted: 2023-01-30

## 2023-01-30 PROCEDURE — G8484 FLU IMMUNIZE NO ADMIN: HCPCS | Performed by: NURSE PRACTITIONER

## 2023-01-30 PROCEDURE — 1123F ACP DISCUSS/DSCN MKR DOCD: CPT | Performed by: NURSE PRACTITIONER

## 2023-01-30 PROCEDURE — G0439 PPPS, SUBSEQ VISIT: HCPCS | Performed by: NURSE PRACTITIONER

## 2023-01-30 PROCEDURE — 3017F COLORECTAL CA SCREEN DOC REV: CPT | Performed by: NURSE PRACTITIONER

## 2023-01-30 RX ORDER — INDOMETHACIN 50 MG/1
50 CAPSULE ORAL 3 TIMES DAILY PRN
Qty: 30 CAPSULE | Refills: 3 | Status: SHIPPED | OUTPATIENT
Start: 2023-01-30

## 2023-01-30 RX ORDER — ALLOPURINOL 100 MG/1
100 TABLET ORAL 2 TIMES DAILY
Qty: 180 TABLET | Refills: 1 | Status: SHIPPED | OUTPATIENT
Start: 2023-01-30

## 2023-01-30 SDOH — ECONOMIC STABILITY: FOOD INSECURITY: WITHIN THE PAST 12 MONTHS, YOU WORRIED THAT YOUR FOOD WOULD RUN OUT BEFORE YOU GOT MONEY TO BUY MORE.: NEVER TRUE

## 2023-01-30 SDOH — HEALTH STABILITY: PHYSICAL HEALTH: ON AVERAGE, HOW MANY DAYS PER WEEK DO YOU ENGAGE IN MODERATE TO STRENUOUS EXERCISE (LIKE A BRISK WALK)?: 7 DAYS

## 2023-01-30 SDOH — ECONOMIC STABILITY: FOOD INSECURITY: WITHIN THE PAST 12 MONTHS, THE FOOD YOU BOUGHT JUST DIDN'T LAST AND YOU DIDN'T HAVE MONEY TO GET MORE.: NEVER TRUE

## 2023-01-30 SDOH — HEALTH STABILITY: PHYSICAL HEALTH: ON AVERAGE, HOW MANY MINUTES DO YOU ENGAGE IN EXERCISE AT THIS LEVEL?: 30 MIN

## 2023-01-30 ASSESSMENT — PATIENT HEALTH QUESTIONNAIRE - PHQ9
SUM OF ALL RESPONSES TO PHQ QUESTIONS 1-9: 0
2. FEELING DOWN, DEPRESSED OR HOPELESS: 0
SUM OF ALL RESPONSES TO PHQ9 QUESTIONS 1 & 2: 0
SUM OF ALL RESPONSES TO PHQ QUESTIONS 1-9: 0
1. LITTLE INTEREST OR PLEASURE IN DOING THINGS: 0

## 2023-01-30 ASSESSMENT — SOCIAL DETERMINANTS OF HEALTH (SDOH): HOW HARD IS IT FOR YOU TO PAY FOR THE VERY BASICS LIKE FOOD, HOUSING, MEDICAL CARE, AND HEATING?: NOT HARD AT ALL

## 2023-01-30 ASSESSMENT — LIFESTYLE VARIABLES
HOW MANY STANDARD DRINKS CONTAINING ALCOHOL DO YOU HAVE ON A TYPICAL DAY: 0
HOW OFTEN DO YOU HAVE SIX OR MORE DRINKS ON ONE OCCASION: 1
HOW OFTEN DO YOU HAVE A DRINK CONTAINING ALCOHOL: 1
HOW MANY STANDARD DRINKS CONTAINING ALCOHOL DO YOU HAVE ON A TYPICAL DAY: PATIENT DOES NOT DRINK
HOW OFTEN DO YOU HAVE A DRINK CONTAINING ALCOHOL: NEVER

## 2023-01-30 NOTE — PROGRESS NOTES
[address]  Dept: [phone]          Kerrie Hamman (:  1950) is a 74 y.o. male,Established patient, here for evaluation of the following chief complaint(s):  Medicare AWV Medicare Annual Wellness Visit    Kerrie Hamman is here for Medicare AW    Assessment & Plan   Encounter for subsequent annual wellness visit (AWV) in Medicare patient  Other forms of angina pectoris (HCC)Being evaluated/managed by pcp. No acute findings today meriting change in management plan. Chronic gouty arthritis  -     allopurinol (ZYLOPRIM) 100 MG tablet; Take 1 tablet by mouth 2 times daily, Disp-180 tablet, R-1Normal      Recommendations for Preventive Services Due: see orders and patient instructions/AVS.  Recommended screening schedule for the next 5-10 years is provided to the patient in written form: see Patient Instructions/AVS.     Return in 1 year (on 2024) for Medicare Annual Wellness Visit in 1 year, Annual check up. Subjective   The following acute and/or chronic problems were also addressed today:      Patient's complete Health Risk Assessment and screening values have been reviewed and are found in Flowsheets. The following problems were reviewed today and where indicated follow up appointments were made and/or referrals ordered. Positive Risk Factor Screenings with Interventions:       Cognitive:    Words recalled: 1 Word Recalled   Clock Drawing Test (CDT): (!) Abnormal   Total Score: (!) 1   Total Score Interpretation: Abnormal Mini-Cog      Interventions:  Patient declines any further evaluation or treatment               Hearing Screen:  Do you or your family notice any trouble with your hearing that hasn't been managed with hearing aids?: (!) Yes    Interventions:  See AVS for additional education material    Vision Screen:  Do you have difficulty driving, watching TV, or doing any of your daily activities because of your eyesight?: No  Have you had an eye exam within the past year?: (!) No  No results found. Interventions:   Patient declines any further evaluation or treatment    Safety:  Do you have any tripping hazards - loose or unsecured carpets or rugs?: (!) Yes  Do you have any tripping hazards - clutter in doorways, halls, or stairs?: (!) Yes  Interventions:  See AVS for additional education material     Advanced Directives:  Do you have a Living Will?: (!) No    Intervention:  has NO advanced directive - information provided                       Objective   Vitals:    01/30/23 0953   BP: 120/82   Site: Left Upper Arm   Pulse: 56   Temp: 97.8 °F (36.6 °C)   TempSrc: Temporal   SpO2: 98%   Weight: 197 lb (89.4 kg)   Height: 5' 8.3\" (1.735 m)      Body mass index is 29.69 kg/m². No Known Allergies  Prior to Visit Medications    Medication Sig Taking? Authorizing Provider   allopurinol (ZYLOPRIM) 100 MG tablet Take 1 tablet by mouth 2 times daily Yes ISAIAH Valenzuela CNP   indomethacin (INDOCIN) 50 MG capsule Take 1 capsule by mouth 3 times daily as needed for Pain (gout) Take with food.  Yes ISAIAH Valenzuela CNP   senna (SENOKOT) 8.6 MG tablet Take 1 tablet by mouth 2 times daily Yes ISAIAH Valenzuela CNP   Cyanocobalamin (VITAMIN B-12 PO) Take by mouth daily Yes Historical Provider, MD   Niacin (VITAMIN B-3 PO) Take by mouth daily Yes Historical Provider, MD   VITAMIN D PO Take by mouth daily Yes Historical Provider, MD   furosemide (LASIX) 20 MG tablet Take 1 tablet by mouth every 48 hours Yes Randall Mathew MD   Multiple Vitamin (MULTIVITAMIN PO) Take by mouth daily  Patient not taking: Reported on 1/30/2023  Historical Provider, MD   tamsulosin (FLOMAX) 0.4 MG capsule Take 1 capsule by mouth daily for 6 doses  Erika Florence MD       [facility] (Including outside providers/suppliers regularly involved in providing care):   Patient Care Team:  ISAIAH Valenzuela CNP as PCP - General (Family Nurse Practitioner)  ISAIAH Grant - CNP as PCP - [facility] Empaneled Provider  Anuj Hopper MD as Cardiologist (Cardiology)     Reviewed and updated this visit:  Allergies  Meds  Problems

## 2023-01-30 NOTE — PATIENT INSTRUCTIONS
Learning About Mild Cognitive Impairment (MCI)  What is mild cognitive impairment (MCI)? It's common to forget things sometimes as we get older. But some older people have memory loss that's more than normal aging. It's called mild cognitive impairment, or MCI. It is not the same as dementia. People with the condition often know that their memory or mental function has changed. Tests may show some loss. But their minds work well overall. They can carry out daily tasks that are normal for them. People with MCI have a higher chance of one day getting dementia. But not all people who have it will get dementia. Some people may stay the same over time. What are the symptoms? People with MCI have more memory loss than what occurs with normal aging. They may have increasing trouble with recalling words and keeping up with conversations. They may also have trouble remembering important events and making decisions. What puts you at risk? The risk of getting MCI increases with age. Having high blood pressure or having a family history of MCI may also increase your risk. How is it diagnosed? Your doctor will do a physical exam.  You may be asked questions to check your memory and other mental skills. Your doctor may also talk to close friends and family members. This can help the doctor figure out how your memory and other mental skills have changed. You may get blood tests and tests that look at your brain. These questions and tests can make sure you don't have other conditions that can cause symptoms like MCI. These include depression, sleep problems, and side effects from medicines. How is it treated? There are no medicines to treat MCI or to keep it from progressing to dementia. But treating conditions like high blood pressure and diabetes may help. A person with MCI needs routine follow-up visits with their doctor to check on changes in the person's mental skills.   How can you care for yourself at home?  Keeping your body active can help slow MCI. Exercises like walking can help. Try to stay active mentally too. Read or do things like crossword puzzles if you enjoy doing them. If you need help coping with MCI, you may want to get support from family, friends, a support group, or a counselor who works with people who have [address] Though the future isn't always clear, it can be good to plan ahead with instructions for your care. These are called advanced directives. Having a plan can help make sure that you get the care you want. Current as of: August 25, 2022               Content Version: 13.5  © 2006-2022 Healthwise, [email or web address]. Care instructions adapted under license by [facility] ([facility]). If you have questions about a medical condition or this instruction, always ask your healthcare professional. [address] any warranty or liability for your use of this information. Learning About Vision Tests  What are vision tests? The four most common vision tests are visual acuity tests, refraction, visual field tests, and color vision tests. Visual acuity (sharpness) tests  These tests are used: To see if you need glasses or contact lenses. To monitor an eye problem. To check an eye injury. Visual acuity tests are done as part of routine exams. You may also have this test when you get your 's license or apply for some types of jobs. Visual field tests  These tests are used: To check for vision loss in any area of your range of vision. To screen for certain eye diseases. To look for nerve damage after a stroke, head injury, or other problem that could reduce blood flow to the brain. Refraction and color tests  A refraction test is done to find the right prescription for glasses and contact lenses. A color vision test is done to check for color blindness.   Color vision is often tested as part of a routine exam. You may also have this test when you apply for a job where recognizing different colors is important, such as , electronics, or the [employer]. How are vision tests done? Visual acuity test   You cover one eye at a time. You read aloud from a wall chart across the room. You read aloud from a small card that you hold in your hand. Refraction   You look into a special device. The device puts lenses of different strengths in front of each eye to see how strong your glasses or contact lenses need to be. Visual field tests   Your doctor may have you look through special machines. Or your doctor may simply have you stare straight ahead while they move a finger into and out of your field of vision. Color vision test   You look at pieces of printed test patterns in various colors. You say what number or symbol you see. Your doctor may have you trace the number or symbol using a pointer. How do these tests feel? There is very little chance of having a problem from this test. If dilating drops are used for a vision test, they may make the eyes sting and cause a medicine taste in the mouth. Follow-up care is a key part of your treatment and safety. Be sure to make and go to all appointments, and call your doctor if you are having problems. It's also a good idea to know your test results and keep a list of the medicines you take. Where can you learn more? Go to http://www.hill.com/ and enter G551 to learn more about \"Learning About Vision Tests. \"  Current as of: October 12, 2022               Content Version: 13.5  © 2006-2022 Healthwise, Incorporated. Care instructions adapted under license by [facility] ([facility]). If you have questions about a medical condition or this instruction, always ask your healthcare professional. [place] [address] any warranty or liability for your use of this information.            Advance Directives: Care Instructions  Overview  An advance directive is a legal way to state your wishes at the end of your life. It tells your family and your doctor what to do if you can't say what you want. There are two main types of advance directives. You can change them any time your wishes change. Living will. This form tells your family and your doctor your wishes about life support and other treatment. The form is also called a declaration. Medical power of . This form lets you name a person to make treatment decisions for you when you can't speak for yourself. This person is called a health care agent (health care proxy, health care surrogate). The form is also called a durable power of  for health care. If you do not have an advance directive, decisions about your medical care may be made by a family member, or by a doctor or a  who doesn't know you. It may help to think of an advance directive as a gift to the people who care for you. If you have one, they won't have to make tough decisions by themselves. For more information, including forms for your state, see the [facility] website (www.caringinfo.org/planning/advance-directives/). Follow-up care is a key part of your treatment and safety. Be sure to make and go to all appointments, and call your doctor if you are having problems. It's also a good idea to know your test results and keep a list of the medicines you take. What should you include in an advance directive? Many states have a unique advance directive form. (It may ask you to address specific issues.) Or you might use a universal form that's approved by many states. If your form doesn't tell you what to address, it may be hard to know what to include in your advance directive. Use the questions below to help you get started. Who do you want to make decisions about your medical care if you are not able to? What life-support measures do you want if you have a serious illness that gets worse over time or can't be cured? What are you most afraid of that might happen?  (Maybe you're afraid of having pain, losing your independence, or being kept alive by machines.)  Where would you prefer to die? (Your home? A hospital? A nursing home?)  Do you want to donate your organs when you die? Do you want certain Bahai practices performed before you die? When should you call for help? Be sure to contact your doctor if you have any questions. Where can you learn more? Go to http://www.hill.com/ and enter R264 to learn more about \"Advance Directives: Care Instructions. \"  Current as of: June 16, 2022               Content Version: 13.5  © 7221-8404 Healthwise, [employer]. Care instructions adapted under license by [facility] ([facility]). If you have questions about a medical condition or this instruction, always ask your healthcare professional. [address] any warranty or liability for your use of this information. Personalized Preventive Plan for [place] Davis - 1/30/2023  Medicare offers a range of preventive health benefits. Some of the tests and screenings are paid in full while other may be subject to a deductible, co-insurance, and/or copay. Some of these benefits include a comprehensive review of your medical history including lifestyle, illnesses that may run in your family, and various assessments and screenings as appropriate. After reviewing your medical record and screening and assessments performed today your provider may have ordered immunizations, labs, imaging, and/or referrals for you. A list of these orders (if applicable) as well as your Preventive Care list are included within your After Visit Summary for your review. Other Preventive Recommendations:    A preventive eye exam performed by an eye specialist is recommended every 1-2 years to screen for glaucoma; cataracts, macular degeneration, and other eye disorders. A preventive dental visit is recommended every 6 months.   Try to get at least 150 minutes of exercise per week or 10,000 steps per day on a pedometer . Order or download the FREE \"Exercise & Physical Activity: Your Everyday Guide\" from [employer]. Call 6-781.226.8949 or search [employer] online. You need 7250-5071 mg of calcium and 4831-3051 IU of vitamin D per day. It is possible to meet your calcium requirement with diet alone, but a vitamin D supplement is usually necessary to meet this goal.  When exposed to the sun, use a sunscreen that protects against both UVA and UVB radiation with an SPF of 30 or greater. Reapply every 2 to 3 hours or after sweating, drying off with a towel, or swimming. Always wear a seat belt when traveling in a car. Always wear a helmet when riding a bicycle or motorcycle. Learning About Mild Cognitive Impairment (MCI)  What is mild cognitive impairment (MCI)? It's common to forget things sometimes as we get older. But some older people have memory loss that's more than normal aging. It's called mild cognitive impairment, or MCI. It is not the same as dementia. People with the condition often know that their memory or mental function has changed. Tests may show some loss. But their minds work well overall. They can carry out daily tasks that are normal for them. People with MCI have a higher chance of one day getting dementia. But not all people who have it will get dementia. Some people may stay the same over time. What are the symptoms? People with MCI have more memory loss than what occurs with normal aging. They may have increasing trouble with recalling words and keeping up with conversations. They may also have trouble remembering important events and making decisions. What puts you at risk? The risk of getting MCI increases with age. Having high blood pressure or having a family history of MCI may also increase your risk. How is it diagnosed?   Your doctor will do a physical exam.  Liliya Orellana may be asked questions to check your memory and other mental skills. Your doctor may also talk to close friends and family members. This can help the doctor figure out how your memory and other mental skills have changed. You may get blood tests and tests that look at your brain. These questions and tests can make sure you don't have other conditions that can cause symptoms like MCI. These include depression, sleep problems, and side effects from medicines. How is it treated? There are no medicines to treat MCI or to keep it from progressing to dementia. But treating conditions like high blood pressure and diabetes may help. A person with MCI needs routine follow-up visits with their doctor to check on changes in the person's mental skills. How can you care for yourself at home? Keeping your body active can help slow MCI. Exercises like walking can help. Try to stay active mentally too. Read or do things like crossword puzzles if you enjoy doing them. If you need help coping with MCI, you may want to get support from family, friends, a support group, or a counselor who works with people who have [address] Though the future isn't always clear, it can be good to plan ahead with instructions for your care. These are called advanced directives. Having a plan can help make sure that you get the care you want. Current as of: August 25, 2022               Content Version: 13.5  © 2006-2022 Healthwise, Incorporated. Care instructions adapted under license by [facility] ([facility]). If you have questions about a medical condition or this instruction, always ask your healthcare professional. [place] [address] any warranty or liability for your use of this information. Learning About Vision Tests  What are vision tests? The four most common vision tests are visual acuity tests, refraction, visual field tests, and color vision tests. Visual acuity (sharpness) tests  These tests are used:   To see if you need glasses or contact lenses. To monitor an eye problem. To check an eye injury. Visual acuity tests are done as part of routine exams. You may also have this test when you get your 's license or apply for some types of jobs. Visual field tests  These tests are used: To check for vision loss in any area of your range of vision. To screen for certain eye diseases. To look for nerve damage after a stroke, head injury, or other problem that could reduce blood flow to the brain. Refraction and color tests  A refraction test is done to find the right prescription for glasses and contact lenses. A color vision test is done to check for color blindness. Color vision is often tested as part of a routine exam. You may also have this test when you apply for a job where recognizing different colors is important, such as , electronics, or the [employer]. How are vision tests done? Visual acuity test   You cover one eye at a time. You read aloud from a wall chart across the room. You read aloud from a small card that you hold in your hand. Refraction   You look into a special device. The device puts lenses of different strengths in front of each eye to see how strong your glasses or contact lenses need to be. Visual field tests   Your doctor may have you look through special machines. Or your doctor may simply have you stare straight ahead while they move a finger into and out of your field of vision. Color vision test   You look at pieces of printed test patterns in various colors. You say what number or symbol you see. Your doctor may have you trace the number or symbol using a pointer. How do these tests feel? There is very little chance of having a problem from this test. If dilating drops are used for a vision test, they may make the eyes sting and cause a medicine taste in the mouth. Follow-up care is a key part of your treatment and safety.  Be sure to make and go to all appointments, and call your doctor if you are having problems. It's also a good idea to know your test results and keep a list of the medicines you take. Where can you learn more? Go to http://www.hill.com/ and enter G551 to learn more about \"Learning About Vision Tests. \"  Current as of: October 12, 2022               Content Version: 13.5  © 2006-2022 "[employer] Care instructions adapted under license by [facility] ([facility]). If you have questions about a medical condition or this instruction, always ask your healthcare professional. [place] [address] any warranty or liability for your use of this information. Advance Directives: Care Instructions  Overview  An advance directive is a legal way to state your wishes at the end of your life. It tells your family and your doctor what to do if you can't say what you want. There are two main types of advance directives. You can change them any time your wishes change. Living will. This form tells your family and your doctor your wishes about life support and other treatment. The form is also called a declaration. Medical power of . This form lets you name a person to make treatment decisions for you when you can't speak for yourself. This person is called a health care agent (health care proxy, health care surrogate). The form is also called a durable power of  for health care. If you do not have an advance directive, decisions about your medical care may be made by a family member, or by a doctor or a  who doesn't know you. It may help to think of an advance directive as a gift to the people who care for you. If you have one, they won't have to make tough decisions by themselves. For more information, including forms for your state, see the [address] website (www.caringinfo.org/planning/advance-directives/). Follow-up care is a key part of your treatment and safety.  Be sure to make and go to all appointments, and call your doctor if you are having problems. It's also a good idea to know your test results and keep a list of the medicines you take. What should you include in an advance directive? Many states have a unique advance directive form. (It may ask you to address specific issues.) Or you might use a universal form that's approved by many states. If your form doesn't tell you what to address, it may be hard to know what to include in your advance directive. Use the questions below to help you get started. Who do you want to make decisions about your medical care if you are not able to? What life-support measures do you want if you have a serious illness that gets worse over time or can't be cured? What are you most afraid of that might happen? (Maybe you're afraid of having pain, losing your independence, or being kept alive by machines.)  Where would you prefer to die? (Your home? A hospital? A nursing home?)  Do you want to donate your organs when you die? Do you want certain Yazidi practices performed before you die? When should you call for help? Be sure to contact your doctor if you have any questions. Where can you learn more? Go to http://www.hill.com/ and enter R264 to learn more about \"Advance Directives: Care Instructions. \"  Current as of: June 16, 2022               Content Version: 13.5  © 7619-4280 Healthwise, Incorporated. Care instructions adapted under license by [facility] ([facility]). If you have questions about a medical condition or this instruction, always ask your healthcare professional. [place] [address] any warranty or liability for your use of this information. Personalized Preventive Plan for Olya Helton - 1/30/2023  Medicare offers a range of preventive health benefits. Some of the tests and screenings are paid in full while other may be subject to a deductible, co-insurance, and/or copay.     Some of these benefits include a comprehensive review of your medical history including lifestyle, illnesses that may run in your family, and various assessments and screenings as appropriate. After reviewing your medical record and screening and assessments performed today your provider may have ordered immunizations, labs, imaging, and/or referrals for you. A list of these orders (if applicable) as well as your Preventive Care list are included within your After Visit Summary for your review. Other Preventive Recommendations:    A preventive eye exam performed by an eye specialist is recommended every 1-2 years to screen for glaucoma; cataracts, macular degeneration, and other eye disorders. A preventive dental visit is recommended every 6 months. Try to get at least 150 minutes of exercise per week or 10,000 steps per day on a pedometer . Order or download the FREE \"Exercise & Physical Activity: Your Everyday Guide\" from [employer]. Call 9-684.559.3179 or search [employer] online. You need 1694-6074 mg of calcium and 9432-3056 IU of vitamin D per day. It is possible to meet your calcium requirement with diet alone, but a vitamin D supplement is usually necessary to meet this goal.  When exposed to the sun, use a sunscreen that protects against both UVA and UVB radiation with an SPF of 30 or greater. Reapply every 2 to 3 hours or after sweating, drying off with a towel, or swimming. Always wear a seat belt when traveling in a car. Always wear a helmet when riding a bicycle or motorcycle.

## 2023-08-14 DIAGNOSIS — M1A.00X0 CHRONIC GOUTY ARTHRITIS: ICD-10-CM

## 2023-08-14 RX ORDER — ALLOPURINOL 100 MG/1
100 TABLET ORAL 2 TIMES DAILY
Qty: 180 TABLET | Refills: 1 | Status: SHIPPED | OUTPATIENT
Start: 2023-08-14

## 2023-08-14 RX ORDER — INDOMETHACIN 50 MG/1
50 CAPSULE ORAL 3 TIMES DAILY PRN
Qty: 30 CAPSULE | Refills: 3 | Status: SHIPPED | OUTPATIENT
Start: 2023-08-14

## 2023-09-11 ENCOUNTER — COMMUNITY OUTREACH (OUTPATIENT)
Dept: FAMILY MEDICINE CLINIC | Age: 73
End: 2023-09-11

## 2025-06-23 ENCOUNTER — APPOINTMENT (OUTPATIENT)
Dept: GENERAL RADIOLOGY | Age: 75
DRG: 884 | End: 2025-06-23
Payer: MEDICARE

## 2025-06-23 ENCOUNTER — HOSPITAL ENCOUNTER (INPATIENT)
Age: 75
LOS: 4 days | Discharge: NURSING, REHAB OR LONG-TERM CARE | DRG: 884 | End: 2025-06-27
Attending: EMERGENCY MEDICINE | Admitting: STUDENT IN AN ORGANIZED HEALTH CARE EDUCATION/TRAINING PROGRAM
Payer: MEDICARE

## 2025-06-23 ENCOUNTER — APPOINTMENT (OUTPATIENT)
Dept: CT IMAGING | Age: 75
DRG: 884 | End: 2025-06-23
Payer: MEDICARE

## 2025-06-23 DIAGNOSIS — R60.0 LOWER EXTREMITY EDEMA: ICD-10-CM

## 2025-06-23 DIAGNOSIS — R00.1 SINUS BRADYCARDIA: ICD-10-CM

## 2025-06-23 DIAGNOSIS — R29.6 FALLS: Primary | ICD-10-CM

## 2025-06-23 DIAGNOSIS — F03.92 DEMENTIA WITH PSYCHOTIC DISTURBANCE, UNSPECIFIED DEMENTIA SEVERITY, UNSPECIFIED DEMENTIA TYPE (HCC): ICD-10-CM
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
## 2025-06-23 LAB
ALBUMIN SERPL BCG-MCNC: 3.6 G/DL (ref 3.4–4.9)
ALP SERPL-CCNC: 86 U/L (ref 40–129)
ALT SERPL W/O P-5'-P-CCNC: 32 U/L (ref 10–50)
ANION GAP SERPL CALC-SCNC: 9 MEQ/L (ref 8–16)
AST SERPL-CCNC: 40 U/L (ref 10–50)
BASOPHILS ABSOLUTE: 0.1 THOU/MM3 (ref 0–0.1)
BASOPHILS NFR BLD AUTO: 1.6 %
BILIRUB SERPL-MCNC: 0.5 MG/DL (ref 0.3–1.2)
BILIRUB UR QL STRIP.AUTO: NEGATIVE
BUN SERPL-MCNC: 18 MG/DL (ref 8–23)
CALCIUM SERPL-MCNC: 8.8 MG/DL (ref 8.8–10.2)
CHARACTER UR: CLEAR
CHLORIDE SERPL-SCNC: 104 MEQ/L (ref 98–111)
CO2 SERPL-SCNC: 29 MEQ/L (ref 22–29)
COLOR, UA: YELLOW
CREAT SERPL-MCNC: 1.2 MG/DL (ref 0.7–1.2)
DEPRECATED RDW RBC AUTO: 43.8 FL (ref 35–45)
EOSINOPHIL NFR BLD AUTO: 2.8 %
EOSINOPHILS ABSOLUTE: 0.1 THOU/MM3 (ref 0–0.4)
ERYTHROCYTE [DISTWIDTH] IN BLOOD BY AUTOMATED COUNT: 12.4 % (ref 11.5–14.5)
GFR SERPL CREATININE-BSD FRML MDRD: 62 ML/MIN/1.73M2
GLUCOSE SERPL-MCNC: 131 MG/DL (ref 74–109)
GLUCOSE UR QL STRIP.AUTO: NEGATIVE MG/DL
HCT VFR BLD AUTO: 40.1 % (ref 42–52)
HGB BLD-MCNC: 13 GM/DL (ref 14–18)
HGB UR QL STRIP.AUTO: NEGATIVE
IMM GRANULOCYTES # BLD AUTO: 0.03 THOU/MM3 (ref 0–0.07)
IMM GRANULOCYTES NFR BLD AUTO: 0.6 %
KETONES UR QL STRIP.AUTO: ABNORMAL
LYMPHOCYTES ABSOLUTE: 1.2 THOU/MM3 (ref 1–4.8)
LYMPHOCYTES NFR BLD AUTO: 23.7 %
MAGNESIUM SERPL-MCNC: 2.3 MG/DL (ref 1.6–2.6)
MCH RBC QN AUTO: 31.1 PG (ref 26–33)
MCHC RBC AUTO-ENTMCNC: 32.4 GM/DL (ref 32.2–35.5)
MCV RBC AUTO: 95.9 FL (ref 80–94)
MONOCYTES ABSOLUTE: 0.5 THOU/MM3 (ref 0.4–1.3)
MONOCYTES NFR BLD AUTO: 11 %
NEUTROPHILS ABSOLUTE: 3 THOU/MM3 (ref 1.8–7.7)
NEUTROPHILS NFR BLD AUTO: 60.3 %
NITRITE UR QL STRIP: NEGATIVE
NRBC BLD AUTO-RTO: 0 /100 WBC
NT-PROBNP SERPL IA-MCNC: 105 PG/ML (ref 0–449)
OSMOLALITY SERPL CALC.SUM OF ELEC: 286.8 MOSMOL/KG (ref 275–300)
PH UR STRIP.AUTO: 7.5 [PH] (ref 5–9)
PLATELET # BLD AUTO: 134 THOU/MM3 (ref 130–400)
PMV BLD AUTO: 10.3 FL (ref 9.4–12.4)
POTASSIUM SERPL-SCNC: 4 MEQ/L (ref 3.5–5.2)
PROT SERPL-MCNC: 5.7 G/DL (ref 6.4–8.3)
PROT UR STRIP.AUTO-MCNC: NEGATIVE MG/DL
RBC # BLD AUTO: 4.18 MILL/MM3 (ref 4.7–6.1)
SODIUM SERPL-SCNC: 142 MEQ/L (ref 135–145)
SP GR UR REFRACT.AUTO: 1.02 (ref 1–1.03)
TROPONIN, HIGH SENSITIVITY: 11 NG/L (ref 0–12)
UROBILINOGEN, URINE: 1 EU/DL (ref 0–1)
WBC # BLD AUTO: 4.9 THOU/MM3 (ref 4.8–10.8)
WBC #/AREA URNS HPF: NEGATIVE /[HPF]

## 2025-06-23 PROCEDURE — 99285 EMERGENCY DEPT VISIT HI MDM: CPT

## 2025-06-23 PROCEDURE — 70450 CT HEAD/BRAIN W/O DYE: CPT

## 2025-06-23 PROCEDURE — 84484 ASSAY OF TROPONIN QUANT: CPT

## 2025-06-23 PROCEDURE — 81003 URINALYSIS AUTO W/O SCOPE: CPT

## 2025-06-23 PROCEDURE — 93005 ELECTROCARDIOGRAM TRACING: CPT | Performed by: EMERGENCY MEDICINE

## 2025-06-23 PROCEDURE — 36415 COLL VENOUS BLD VENIPUNCTURE: CPT

## 2025-06-23 PROCEDURE — 80053 COMPREHEN METABOLIC PANEL: CPT

## 2025-06-23 PROCEDURE — 83735 ASSAY OF MAGNESIUM: CPT

## 2025-06-23 PROCEDURE — 83880 ASSAY OF NATRIURETIC PEPTIDE: CPT

## 2025-06-23 PROCEDURE — 1200000000 HC SEMI PRIVATE

## 2025-06-23 PROCEDURE — 71045 X-RAY EXAM CHEST 1 VIEW: CPT

## 2025-06-23 PROCEDURE — 85025 COMPLETE CBC W/AUTO DIFF WBC: CPT

## 2025-06-23 RX ORDER — POLYETHYLENE GLYCOL 3350 17 G/17G
17 POWDER, FOR SOLUTION ORAL DAILY PRN
Status: DISCONTINUED | OUTPATIENT
Start: 2025-06-23 | End: 2025-06-27 | Stop reason: HOSPADM

## 2025-06-23 RX ORDER — ONDANSETRON 4 MG/1
4 TABLET, ORALLY DISINTEGRATING ORAL EVERY 8 HOURS PRN
Status: DISCONTINUED | OUTPATIENT
Start: 2025-06-23 | End: 2025-06-27 | Stop reason: HOSPADM

## 2025-06-23 RX ORDER — ACETAMINOPHEN 650 MG/1
650 SUPPOSITORY RECTAL EVERY 6 HOURS PRN
Status: DISCONTINUED | OUTPATIENT
Start: 2025-06-23 | End: 2025-06-27 | Stop reason: HOSPADM

## 2025-06-23 RX ORDER — SODIUM CHLORIDE 9 MG/ML
INJECTION, SOLUTION INTRAVENOUS PRN
Status: DISCONTINUED | OUTPATIENT
Start: 2025-06-23 | End: 2025-06-27 | Stop reason: HOSPADM

## 2025-06-23 RX ORDER — ONDANSETRON 2 MG/ML
4 INJECTION INTRAMUSCULAR; INTRAVENOUS EVERY 6 HOURS PRN
Status: DISCONTINUED | OUTPATIENT
Start: 2025-06-23 | End: 2025-06-27 | Stop reason: HOSPADM

## 2025-06-23 RX ORDER — POTASSIUM CHLORIDE 1500 MG/1
40 TABLET, EXTENDED RELEASE ORAL PRN
Status: DISCONTINUED | OUTPATIENT
Start: 2025-06-23 | End: 2025-06-27 | Stop reason: HOSPADM

## 2025-06-23 RX ORDER — POTASSIUM CHLORIDE 7.45 MG/ML
10 INJECTION INTRAVENOUS PRN
Status: DISCONTINUED | OUTPATIENT
Start: 2025-06-23 | End: 2025-06-27 | Stop reason: HOSPADM

## 2025-06-23 RX ORDER — MAGNESIUM SULFATE IN WATER 40 MG/ML
2000 INJECTION, SOLUTION INTRAVENOUS PRN
Status: DISCONTINUED | OUTPATIENT
Start: 2025-06-23 | End: 2025-06-27 | Stop reason: HOSPADM

## 2025-06-23 RX ORDER — ENOXAPARIN SODIUM 100 MG/ML
40 INJECTION SUBCUTANEOUS DAILY
Status: DISCONTINUED | OUTPATIENT
Start: 2025-06-24 | End: 2025-06-27 | Stop reason: HOSPADM

## 2025-06-23 RX ORDER — SODIUM CHLORIDE 0.9 % (FLUSH) 0.9 %
5-40 SYRINGE (ML) INJECTION EVERY 12 HOURS SCHEDULED
Status: DISCONTINUED | OUTPATIENT
Start: 2025-06-23 | End: 2025-06-27 | Stop reason: HOSPADM

## 2025-06-23 RX ORDER — SODIUM CHLORIDE 0.9 % (FLUSH) 0.9 %
5-40 SYRINGE (ML) INJECTION PRN
Status: DISCONTINUED | OUTPATIENT
Start: 2025-06-23 | End: 2025-06-27 | Stop reason: HOSPADM

## 2025-06-23 RX ORDER — ACETAMINOPHEN 325 MG/1
650 TABLET ORAL EVERY 6 HOURS PRN
Status: DISCONTINUED | OUTPATIENT
Start: 2025-06-23 | End: 2025-06-27 | Stop reason: HOSPADM

## 2025-06-23 ASSESSMENT — PAIN - FUNCTIONAL ASSESSMENT
PAIN_FUNCTIONAL_ASSESSMENT: NONE - DENIES PAIN

## 2025-06-24 ENCOUNTER — APPOINTMENT (OUTPATIENT)
Age: 75
DRG: 884 | End: 2025-06-24
Payer: MEDICARE

## 2025-06-24 PROBLEM — M79.89 SWELLING OF BOTH LOWER EXTREMITIES: Status: ACTIVE | Noted: 2025-06-24

## 2025-06-24 PROBLEM — R44.3 HALLUCINATIONS: Status: ACTIVE | Noted: 2025-06-24

## 2025-06-24 PROBLEM — F03.C0 SEVERE DEMENTIA WITHOUT BEHAVIORAL DISTURBANCE, PSYCHOTIC DISTURBANCE, MOOD DISTURBANCE, OR ANXIETY (HCC): Status: ACTIVE | Noted: 2025-06-24

## 2025-06-24 PROBLEM — Z87.891 HISTORY OF TOBACCO ABUSE: Status: ACTIVE | Noted: 2025-06-24

## 2025-06-24 PROBLEM — R29.6 FALLS: Status: ACTIVE | Noted: 2025-06-24

## 2025-06-24 PROBLEM — Z87.39 HISTORY OF GOUT: Status: ACTIVE | Noted: 2025-06-24

## 2025-06-24 PROBLEM — F03.918 DEMENTIA WITH BEHAVIORAL DISTURBANCE (HCC): Status: ACTIVE | Noted: 2025-06-24

## 2025-06-24 PROBLEM — I10 PRIMARY HYPERTENSION: Status: ACTIVE | Noted: 2025-06-24

## 2025-06-24 LAB
ANION GAP SERPL CALC-SCNC: 10 MEQ/L (ref 8–16)
BASOPHILS ABSOLUTE: 0.1 THOU/MM3 (ref 0–0.1)
BASOPHILS NFR BLD AUTO: 1.4 %
BUN SERPL-MCNC: 14 MG/DL (ref 8–23)
CALCIUM SERPL-MCNC: 8.8 MG/DL (ref 8.8–10.2)
CHLORIDE SERPL-SCNC: 104 MEQ/L (ref 98–111)
CO2 SERPL-SCNC: 29 MEQ/L (ref 22–29)
CREAT SERPL-MCNC: 1 MG/DL (ref 0.7–1.2)
DEPRECATED RDW RBC AUTO: 42.3 FL (ref 35–45)
ECHO AO ASC DIAM: 3.6 CM
ECHO AO ASCENDING AORTA INDEX: 1.91 CM/M2
ECHO AV CUSP MM: 2 CM
ECHO AV PEAK GRADIENT: 7 MMHG
ECHO AV PEAK VELOCITY: 1.3 M/S
ECHO AV VELOCITY RATIO: 0.85
ECHO BSA: 1.89 M2
ECHO EST RA PRESSURE: 3 MMHG
ECHO LA AREA 2C: 18.1 CM2
ECHO LA AREA 4C: 18.4 CM2
ECHO LA DIAMETER INDEX: 2.02 CM/M2
ECHO LA DIAMETER: 3.8 CM
ECHO LA MAJOR AXIS: 5.4 CM
ECHO LA MINOR AXIS: 5.6 CM
ECHO LA VOL BP: 50 ML (ref 18–58)
ECHO LA VOL MOD A2C: 47 ML (ref 18–58)
ECHO LA VOL MOD A4C: 52 ML (ref 18–58)
ECHO LA VOL/BSA BIPLANE: 27 ML/M2 (ref 16–34)
ECHO LA VOLUME INDEX MOD A2C: 25 ML/M2 (ref 16–34)
ECHO LA VOLUME INDEX MOD A4C: 28 ML/M2 (ref 16–34)
ECHO LV E' LATERAL VELOCITY: 10 CM/S
ECHO LV E' SEPTAL VELOCITY: 5.8 CM/S
ECHO LV EF PHYSICIAN: 60 %
ECHO LV FRACTIONAL SHORTENING: 34 % (ref 28–44)
ECHO LV INTERNAL DIMENSION DIASTOLE INDEX: 2.66 CM/M2
ECHO LV INTERNAL DIMENSION DIASTOLIC: 5 CM (ref 4.2–5.9)
ECHO LV INTERNAL DIMENSION SYSTOLIC INDEX: 1.76 CM/M2
ECHO LV INTERNAL DIMENSION SYSTOLIC: 3.3 CM
ECHO LV ISOVOLUMETRIC RELAXATION TIME (IVRT): 88 MS
ECHO LV IVSD: 0.8 CM (ref 0.6–1)
ECHO LV MASS 2D: 146.8 G (ref 88–224)
ECHO LV MASS INDEX 2D: 78.1 G/M2 (ref 49–115)
ECHO LV POSTERIOR WALL DIASTOLIC: 0.9 CM (ref 0.6–1)
ECHO LV RELATIVE WALL THICKNESS RATIO: 0.36
ECHO LVOT PEAK GRADIENT: 5 MMHG
ECHO LVOT PEAK VELOCITY: 1.1 M/S
ECHO MV A VELOCITY: 0.71 M/S
ECHO MV E DECELERATION TIME (DT): 234 MS
ECHO MV E VELOCITY: 0.5 M/S
ECHO MV E/A RATIO: 0.7
ECHO MV E/E' LATERAL: 5
ECHO MV E/E' RATIO (AVERAGED): 6.81
ECHO MV E/E' SEPTAL: 8.62
ECHO MV REGURGITANT PEAK GRADIENT: 34 MMHG
ECHO MV REGURGITANT PEAK VELOCITY: 2.9 M/S
ECHO PULMONARY ARTERY END DIASTOLIC PRESSURE: 7 MMHG
ECHO PV MAX VELOCITY: 0.7 M/S
ECHO PV PEAK GRADIENT: 2 MMHG
ECHO PV REGURGITANT MAX VELOCITY: 1.3 M/S
ECHO RV INTERNAL DIMENSION: 3.1 CM
ECHO RV TAPSE: 1.8 CM (ref 1.7–?)
ECHO TV E WAVE: 0.5 M/S
EKG ATRIAL RATE: 47 BPM
EKG ATRIAL RATE: 78 BPM
EKG P AXIS: -75 DEGREES
EKG P AXIS: 69 DEGREES
EKG P-R INTERVAL: 152 MS
EKG P-R INTERVAL: 216 MS
EKG Q-T INTERVAL: 432 MS
EKG Q-T INTERVAL: 512 MS
EKG QRS DURATION: 102 MS
EKG QRS DURATION: 104 MS
EKG QTC CALCULATION (BAZETT): 453 MS
EKG QTC CALCULATION (BAZETT): 492 MS
EKG R AXIS: -35 DEGREES
EKG T AXIS: 49 DEGREES
EKG T AXIS: 58 DEGREES
EKG VENTRICULAR RATE: 47 BPM
EKG VENTRICULAR RATE: 78 BPM
EOSINOPHIL NFR BLD AUTO: 2.9 %
EOSINOPHILS ABSOLUTE: 0.2 THOU/MM3 (ref 0–0.4)
ERYTHROCYTE [DISTWIDTH] IN BLOOD BY AUTOMATED COUNT: 12.2 % (ref 11.5–14.5)
GFR SERPL CREATININE-BSD FRML MDRD: 78 ML/MIN/1.73M2
GLUCOSE SERPL-MCNC: 84 MG/DL (ref 74–109)
HCT VFR BLD AUTO: 43.7 % (ref 42–52)
HGB BLD-MCNC: 14.7 GM/DL (ref 14–18)
IMM GRANULOCYTES # BLD AUTO: 0.04 THOU/MM3 (ref 0–0.07)
IMM GRANULOCYTES NFR BLD AUTO: 0.8 %
LYMPHOCYTES ABSOLUTE: 1.2 THOU/MM3 (ref 1–4.8)
LYMPHOCYTES NFR BLD AUTO: 22.5 %
MCH RBC QN AUTO: 31.5 PG (ref 26–33)
MCHC RBC AUTO-ENTMCNC: 33.6 GM/DL (ref 32.2–35.5)
MCV RBC AUTO: 93.8 FL (ref 80–94)
MONOCYTES ABSOLUTE: 0.5 THOU/MM3 (ref 0.4–1.3)
MONOCYTES NFR BLD AUTO: 8.9 %
NEUTROPHILS ABSOLUTE: 3.3 THOU/MM3 (ref 1.8–7.7)
NEUTROPHILS NFR BLD AUTO: 63.5 %
NRBC BLD AUTO-RTO: 0 /100 WBC
PLATELET # BLD AUTO: 142 THOU/MM3 (ref 130–400)
PMV BLD AUTO: 10.1 FL (ref 9.4–12.4)
POTASSIUM SERPL-SCNC: 4.4 MEQ/L (ref 3.5–5.2)
RBC # BLD AUTO: 4.66 MILL/MM3 (ref 4.7–6.1)
SODIUM SERPL-SCNC: 143 MEQ/L (ref 135–145)
WBC # BLD AUTO: 5.2 THOU/MM3 (ref 4.8–10.8)

## 2025-06-24 PROCEDURE — 93306 TTE W/DOPPLER COMPLETE: CPT

## 2025-06-24 PROCEDURE — 93010 ELECTROCARDIOGRAM REPORT: CPT | Performed by: INTERNAL MEDICINE

## 2025-06-24 PROCEDURE — 2500000003 HC RX 250 WO HCPCS

## 2025-06-24 PROCEDURE — 6360000002 HC RX W HCPCS

## 2025-06-24 PROCEDURE — 93306 TTE W/DOPPLER COMPLETE: CPT | Performed by: INTERNAL MEDICINE

## 2025-06-24 PROCEDURE — 93005 ELECTROCARDIOGRAM TRACING: CPT | Performed by: STUDENT IN AN ORGANIZED HEALTH CARE EDUCATION/TRAINING PROGRAM

## 2025-06-24 PROCEDURE — APPSS30 APP SPLIT SHARED TIME 16-30 MINUTES: Performed by: PHYSICIAN ASSISTANT

## 2025-06-24 PROCEDURE — 85025 COMPLETE CBC W/AUTO DIFF WBC: CPT

## 2025-06-24 PROCEDURE — 99223 1ST HOSP IP/OBS HIGH 75: CPT | Performed by: STUDENT IN AN ORGANIZED HEALTH CARE EDUCATION/TRAINING PROGRAM

## 2025-06-24 PROCEDURE — 97166 OT EVAL MOD COMPLEX 45 MIN: CPT

## 2025-06-24 PROCEDURE — 80048 BASIC METABOLIC PNL TOTAL CA: CPT

## 2025-06-24 PROCEDURE — 36415 COLL VENOUS BLD VENIPUNCTURE: CPT

## 2025-06-24 PROCEDURE — 1200000000 HC SEMI PRIVATE

## 2025-06-24 PROCEDURE — 92610 EVALUATE SWALLOWING FUNCTION: CPT

## 2025-06-24 PROCEDURE — 97535 SELF CARE MNGMENT TRAINING: CPT

## 2025-06-24 PROCEDURE — 6370000000 HC RX 637 (ALT 250 FOR IP): Performed by: STUDENT IN AN ORGANIZED HEALTH CARE EDUCATION/TRAINING PROGRAM

## 2025-06-24 RX ORDER — DOCUSATE SODIUM 100 MG/1
100 CAPSULE, LIQUID FILLED ORAL 2 TIMES DAILY
COMMUNITY

## 2025-06-24 RX ORDER — MEMANTINE HYDROCHLORIDE 10 MG/1
10 TABLET ORAL 2 TIMES DAILY
COMMUNITY

## 2025-06-24 RX ORDER — DOCUSATE SODIUM 100 MG/1
100 CAPSULE, LIQUID FILLED ORAL 2 TIMES DAILY
Status: DISCONTINUED | OUTPATIENT
Start: 2025-06-24 | End: 2025-06-25

## 2025-06-24 RX ORDER — ESCITALOPRAM OXALATE 10 MG/1
10 TABLET ORAL DAILY
Status: ON HOLD | COMMUNITY
End: 2025-06-27 | Stop reason: HOSPADM

## 2025-06-24 RX ORDER — LORAZEPAM 0.5 MG/1
0.5 TABLET ORAL EVERY 8 HOURS PRN
COMMUNITY

## 2025-06-24 RX ORDER — MEMANTINE HYDROCHLORIDE 10 MG/1
10 TABLET ORAL 2 TIMES DAILY
Status: DISCONTINUED | OUTPATIENT
Start: 2025-06-24 | End: 2025-06-27 | Stop reason: HOSPADM

## 2025-06-24 RX ORDER — LORAZEPAM 0.5 MG/1
0.5 TABLET ORAL EVERY 8 HOURS PRN
Status: DISCONTINUED | OUTPATIENT
Start: 2025-06-24 | End: 2025-06-25

## 2025-06-24 RX ORDER — ESCITALOPRAM OXALATE 10 MG/1
10 TABLET ORAL DAILY
Status: DISCONTINUED | OUTPATIENT
Start: 2025-06-24 | End: 2025-06-24

## 2025-06-24 RX ORDER — ESCITALOPRAM OXALATE 10 MG/1
15 TABLET ORAL DAILY
Status: DISCONTINUED | OUTPATIENT
Start: 2025-06-25 | End: 2025-06-27 | Stop reason: HOSPADM

## 2025-06-24 RX ORDER — ESCITALOPRAM OXALATE 10 MG/1
15 TABLET ORAL DAILY
Status: DISCONTINUED | OUTPATIENT
Start: 2025-06-24 | End: 2025-06-24

## 2025-06-24 RX ORDER — ESCITALOPRAM OXALATE 5 MG/1
15 TABLET ORAL DAILY
COMMUNITY
Start: 2025-06-18

## 2025-06-24 RX ORDER — FUROSEMIDE 20 MG/1
20 TABLET ORAL
Status: DISCONTINUED | OUTPATIENT
Start: 2025-06-25 | End: 2025-06-27 | Stop reason: HOSPADM

## 2025-06-24 RX ORDER — DONEPEZIL HYDROCHLORIDE 10 MG/1
10 TABLET, FILM COATED ORAL NIGHTLY
COMMUNITY

## 2025-06-24 RX ORDER — DONEPEZIL HYDROCHLORIDE 10 MG/1
10 TABLET, FILM COATED ORAL NIGHTLY
Status: DISCONTINUED | OUTPATIENT
Start: 2025-06-24 | End: 2025-06-27 | Stop reason: HOSPADM

## 2025-06-24 RX ORDER — ACETAMINOPHEN 325 MG/1
650 TABLET ORAL EVERY 6 HOURS PRN
COMMUNITY

## 2025-06-24 RX ADMIN — SODIUM CHLORIDE, PRESERVATIVE FREE 10 ML: 5 INJECTION INTRAVENOUS at 00:39

## 2025-06-24 RX ADMIN — ENOXAPARIN SODIUM 40 MG: 100 INJECTION SUBCUTANEOUS at 08:21

## 2025-06-24 RX ADMIN — SODIUM CHLORIDE, PRESERVATIVE FREE 10 ML: 5 INJECTION INTRAVENOUS at 20:16

## 2025-06-24 RX ADMIN — MEMANTINE 10 MG: 10 TABLET ORAL at 22:54

## 2025-06-24 RX ADMIN — DONEPEZIL HYDROCHLORIDE 10 MG: 10 TABLET, FILM COATED ORAL at 22:54

## 2025-06-24 RX ADMIN — SODIUM CHLORIDE, PRESERVATIVE FREE 10 ML: 5 INJECTION INTRAVENOUS at 08:21

## 2025-06-24 RX ADMIN — Medication 3 MG: at 20:15

## 2025-06-24 ASSESSMENT — PAIN - FUNCTIONAL ASSESSMENT
PAIN_FUNCTIONAL_ASSESSMENT: NONE - DENIES PAIN

## 2025-06-24 NOTE — H&P
History & Physical  Internal Medicine Resident         Patient: Jayden Cadet 74 y.o. male      : 1950  Date of Admission: 2025  Date of Service: Pt seen/examined on 25 and Admitted to Inpatient with expected LOS less than two midnights due to medical therapy.       ASSESSMENT AND PLAN  Hallucinations and aggression ISO progressive dementia: Patient presented via EMS secondary to hallucinations and aggression towards other residents at his assisted living.  Family states that the dementia has been progressive over the last few years.  They state that he began having hallucinations in January.  They state that he has become more aggressive over the last couple weeks.  They also note that he wanders out of the facility.  They do not feel like he is safe enough in assisted living and would like SNF placement.  Patient is alert and oriented to self.  Patient had dementia screening the other day that noted to score of 7/30.  CT head noting cerebral volume loss.  No other acute findings.  UA negative.  CXR negative.  Patient has no other focal complaints.  PT OT evaluation  Social work consult  Chronic Conditions (reviewed and stable unless otherwise stated)   Chronic BLE swelling: Chronic venous stasis versus cardiac etiology.  Last echo  noting LVEF 55 to 60%.  Will order echo.  On Lasix 20 mg daily outpatient.  Gout: On allopurinol 100 mg twice daily  HTN: Not currently on any antihypertensives.  BPH: On Flomax 0.4 mg.  Bladder scan ordered.  History of tobacco abuse      Data reviewed (unless otherwise discussed in assessment/plan)  EKG:  I have reviewed the EKG with the following interpretation: Sinus bradycardia first AV block  Labs: Reviewed, see chart and plan above.       =======================================================================    SUBJECTIVE    Chief Complaint: Aggression    History Of Present Illness:  Jayden Cadet is a 74 y.o. male with PMHx of gout, BPH,

## 2025-06-24 NOTE — CARE COORDINATION
6/24/25, 1:01 PM EDT    DISCHARGE PLANNING EVALUATION       Spoke with patient' daughter in law Darling and she would like a referral to [facility].  Called and spoke with son Anshu whom gave permission to speak with daughter in law Hastings.  Referral to [facility] for a dementia unit.      Readmission Risk Low 0-14, Mod 15-19), High > 20: Readmission Risk Score: 13.6    Current PCP: No, Pcp  PCP verified by CM? Yes    Patient Orientation: Other (see comment) (Spokewith family.  patient confused.)    Patient Cognition: Dementia / Early Alzheimer's  History Provided by: Child/Family    Advance Directives:      Code Status: Limited   Patient's Primary Decision Maker is: Patient Declined (Legal Next of Kin Remains as Decision Maker)    Primary Decision Maker: Diego Cadet - Child - [phone]    Secondary Decision Maker: Pelon Cadet - Child - [phone]     Discharge Planning:    Patient lives with: Other (Comment) (ECF) Type of Home: Skilled Nursing Facility  Primary Care Giver: Other (Comment) (AL staff)  Patient Support Systems include: Children   Current Financial resources: Medicaid, Medicare  Current community resources: ECF/Home Care  Current services prior to admission: Skilled Nursing Facility            Current DME:              Type of Home Care services:  None    ADLS  Prior functional level: Assistance with the following:, Housework, Cooking, Shopping, Bathing  Current functional level: Assistance with the following:, Cooking, Housework, Shopping, Bathing    Family can provide assistance at DC: No  Would you like Case Management to discuss the discharge plan with any other family members/significant others, and if so, who? Yes  Plans to Return to Present Housing: No  Other Identified Issues/Barriers to RETURNING to current housing: Patient not planning to return to assisted living.  ECF referrals being made.   Potential Assistance needed at discharge: Skilled Nursing Facility            Potential DME:

## 2025-06-24 NOTE — ED NOTES
Pt resting on cot w/ eyes open watching tv upon entrance. Respirations even and unlabored. Pt denies pain at this time. Light off for pt comfort.

## 2025-06-24 NOTE — ED NOTES
Pt resting on cot w/ eyes open watching tv upon entrance. Respirations even and unlabored. Family present at bedside. Pt denies pain at this time.

## 2025-06-24 NOTE — ED NOTES
Pt resting on cot w/ eyes closed upon entrance. Pt alert to voice. Respirations even and unlabored. Pt denies pain at this time.

## 2025-06-24 NOTE — PROCEDURES
PROCEDURE NOTE  Date: 6/24/2025   Name: Jayden Cadet  YOB: 1950    Procedures  12 lead EKG completed. Results handed to Kaitlin REARDON.

## 2025-06-24 NOTE — CARE COORDINATION
Case Management Assessment Initial Evaluation    Date/Time of Evaluation: 6/24/2025 10:53 AM  Assessment Completed by: Kyle Garcia RN    If patient is discharged prior to next notation, then this note serves as note for discharge by case management.    Patient Name: Jayden Cadet                   YOB: 1950  Diagnosis: Lower extremity edema [R60.0]  Sinus bradycardia [R00.1]  Falls [R29.6]  Fall at home, initial encounter [W19.XXXA, Y92.009]  Dementia with psychotic disturbance, unspecified dementia severity, unspecified dementia type (HCC) [F03.92]                   Date / Time: 6/23/2025  7:56 PM  [facility]: [facility]     Patient Admission Status: Inpatient   Readmission Risk Low 0-14, Mod 15-19), High > 20: Readmission Risk Score: 14.9    Current PCP: [facility]  Health Care Decision Makers:   Primary Decision Maker: Diego Cadet - [phone]    Secondary Decision Maker: ChichiPelon bolivar Peri Jones - [phone]    Additional Case Management Notes: Admitted for aggressive behavior at AL. Bradycardic, 40s-60s. Psych consult. PT/OT/SLP evals.     Procedures: none    Imaging: neg for acute    Patient Goals/Plan/Treatment Preferences: [facility]den is from [facility]. JOHNNY consulted for ECF placement.

## 2025-06-24 NOTE — CONSULTS
Department of Psychiatry  Consult Service   Psychiatric Assessment      Thank you very much for allowing us to participate in the care of this patient.      Reason for Consult: geripsych placement for dementia with behavioral disturbance, medication recommendations    HISTORY OF PRESENT ILLNESS:          The patient is a 74 y.o. male with significant history of gout, BPH, tobacco abuse, dementia  who is admitted medically due to hallucinations and aggression towards others.    Patient presented via EMS on 06/23 secondary to hallucinations and aggression towards other residents at his assisted living.  Family stated that the dementia has been progressive over the last few years.  Family stated that Jayden began having hallucinations in January.  Family stated that Jayden has become more aggressive over the last couple weeks.  Family also noted that Jayden wanders out of the facility.  Family does not feel like Jayden is safe enough in assisted living and would like SNF placement.  It was reported that the patient had dementia screening the other day that noted to score of 7/30.     Psychiatry was consulted for geripsych placement for dementia with behavioral disturbance, medication recommendations    Jayden was seen in his room.  He was sitting in his chair watching TV.  Jayden was pleasant and receptive to interaction.  He brightened and was smiling during the assessment.  Jayden was oriented to himself and somewhat to place. He knew he was at [facility] but did not know he was in [place]. He stated that \"they\" brought him here from the other facility so \"I wouldn't be a problem.\" He did not elaborate on what he meant about being a problem.  He also was unable to recall the name of the assisted living facility that he came from. He was not oriented to the date. He also was unsure if he was  or how many children he had.  He was asked if there had been any issues for him at the assisted living facility.  He

## 2025-06-24 NOTE — PLAN OF CARE
Problem: Discharge Planning  Goal: Discharge to home or other facility with appropriate resources  6/24/2025 1719 by Diane Whittington RN  Outcome: Progressing  Flowsheets (Taken 6/24/2025 1719)  Discharge to home or other facility with appropriate resources:   Identify barriers to discharge with patient and caregiver   Arrange for needed discharge resources and transportation as appropriate   Identify discharge learning needs (meds, wound care, etc)  Note: Patient plans to discharge to a SNF when medically stable.      Problem: Skin/Tissue Integrity  Goal: Skin integrity remains intact  Outcome: Progressing  Flowsheets (Taken 6/24/2025 1719)  Skin Integrity Remains Intact:   Monitor for areas of redness and/or skin breakdown   Turn and reposition as indicated  Note: No new skin break down noted at this time. Encouraged patient to reposition self in bed.      Problem: Safety - Adult  Goal: Free from fall injury  Outcome: Progressing  Flowsheets (Taken 6/24/2025 1719)  Free From Fall Injury: Instruct family/caregiver on patient safety  Note: No falls noted this shift. Continue falling star program. Bed alarm on, bed in low position. Call light and personal belongings in reach.  Patient uses call light appropriately.    Care plan reviewed with patient. Patient verbalizes understanding of plan of care and contributes to goal setting.

## 2025-06-24 NOTE — ED TRIAGE NOTES
Pt presents to ED from [facility] at [place] via EMS w/ c/o increased aggression. EMS reports pt has hx of dementia and states pt was calm and cooperative throughout transport. Pt is calm and cooperative throughout triage. Pt denies any pain at this time.

## 2025-06-24 NOTE — ED NOTES
Spoke to  staff who approved patient transfer to [facility]. Patient transported upstairs in stable condition.

## 2025-06-24 NOTE — CARE COORDINATION
6/24/25, 2:16 PM EDT    DISCHARGE PLANNING EVALUATION       Spoke with Lila at [facility] and [facility] is reviewing.  They are working on getting notes from [facility] to check behaviors.  They are checking patient's insurance also.  Will reeval tomorrow.  Left message to update daughter in law Darling.

## 2025-06-24 NOTE — ED NOTES
Pt resting on co w/ eyes open upon entrance. Respirations even and unlabored. Family and provider at bedside. Pt denies pain at this time.

## 2025-06-24 NOTE — ED PROVIDER NOTES
[facility]  EMERGENCY DEPARTMENT ENCOUNTER          Pt Name: Jayden Cadet  MRN: [id number]  Birthdate 1950  Date of evaluation: 6/23/2025  Treating Resident Physician: Yandel Kidd MD  Supervising Physician: Ricardo Goff DO    History obtained from child and the patient.     CHIEF COMPLAINT       Chief Complaint   Patient presents with    Aggressive Behavior       HISTORY OF PRESENT ILLNESS    Jayden Cadet is a 74 y.o. male with a PMH of dementia on donepezil and memantine, hypertension, kidney stone, pneumothorax who presents to the emergency department by squad from assisted living facility for aggressive behavior.  Family sitting at bedside.  No aggressive behavior on evaluation and patient pleasant appearing.  Family at bedside reports patient has been having episodes of aggressive behavior for the last 9 days.  Family states patient used to live independently in December, but was moved to SNF for frequently getting lost and confused.  He has been to 2 different assisted living facilities, 3 months at a time, but has been experiencing some difficulty coping with other residents.  Family has attempted placing him in the unit with dementia unit, but in vain.  Today, patient became very aggressive and combative, reported auditory hallucinations, reportedly stating to another resident to \"be a man.\"  Family reports weight loss, insomnia, decreased appetite.  Patient is incontinent of urine, and no reported episodes of fever, trauma/injury/fall.  Patient answering appropriately to questions and appears pleasant.  Patient noted to have sunken eyes, and reports he usually is not hungry.  He denies any suicidal/homicidal ideation.  He has bilateral swelling of the lower extremities which family attributes to gout.  Distant history of drinking, but no known cardiac history.  Family denies workup for depression, but on review of medications, patient on

## 2025-06-24 NOTE — ED NOTES
Pt resting on cot w/ eyes open watching tv upon entrance. Respirations even and unlabored. Pt denies pain at this time. Light off for p comfort. Pillow provided.

## 2025-06-24 NOTE — ED NOTES
ED to inpatient nurses report    Chief Complaint   Patient presents with    Aggressive Behavior      Present to ED from assisted living  LOC: pt oriented to name / hx of dementia  Vital signs   Vitals:    06/23/25 2007 06/23/25 2125 06/23/25 2225   BP: (!) 106/41 (!) 121/58 137/69   Pulse: 50 (!) 47 (!) 48   Resp: 14 16 15   Temp: 97.6 °F (36.4 °C)     TempSrc: Oral     SpO2: 100% 100% 99%   Weight: 75.8 kg (167 lb)     Height: 1.727 m (5' 8\")        Oxygen Baseline room air    Current needs required room air Bipap/Cpap No  LDAs:    Mobility: Requires assistance * 1  Pending ED orders: complete  Present condition: stable      Electronically signed by Lamar Campos RN on 6/23/2025 at 11:35 PM

## 2025-06-25 PROBLEM — R00.1 SINUS BRADYCARDIA: Status: ACTIVE | Noted: 2025-06-25

## 2025-06-25 PROBLEM — E43 SEVERE MALNUTRITION: Status: ACTIVE | Noted: 2025-06-25

## 2025-06-25 LAB
ANION GAP SERPL CALC-SCNC: 9 MEQ/L (ref 8–16)
BASOPHILS ABSOLUTE: 0.1 THOU/MM3 (ref 0–0.1)
BASOPHILS NFR BLD AUTO: 1.3 %
BUN SERPL-MCNC: 12 MG/DL (ref 8–23)
CALCIUM SERPL-MCNC: 9.2 MG/DL (ref 8.8–10.2)
CHLORIDE SERPL-SCNC: 105 MEQ/L (ref 98–111)
CO2 SERPL-SCNC: 28 MEQ/L (ref 22–29)
CREAT SERPL-MCNC: 1 MG/DL (ref 0.7–1.2)
DEPRECATED RDW RBC AUTO: 42.4 FL (ref 35–45)
EKG ATRIAL RATE: 47 BPM
EKG P AXIS: 79 DEGREES
EKG P-R INTERVAL: 202 MS
EKG Q-T INTERVAL: 520 MS
EKG QRS DURATION: 102 MS
EKG QTC CALCULATION (BAZETT): 460 MS
EKG R AXIS: -38 DEGREES
EKG T AXIS: 42 DEGREES
EKG VENTRICULAR RATE: 47 BPM
EOSINOPHIL NFR BLD AUTO: 2.3 %
EOSINOPHILS ABSOLUTE: 0.1 THOU/MM3 (ref 0–0.4)
ERYTHROCYTE [DISTWIDTH] IN BLOOD BY AUTOMATED COUNT: 12.3 % (ref 11.5–14.5)
GFR SERPL CREATININE-BSD FRML MDRD: 78 ML/MIN/1.73M2
GLUCOSE SERPL-MCNC: 84 MG/DL (ref 74–109)
HCT VFR BLD AUTO: 43 % (ref 42–52)
HGB BLD-MCNC: 14.8 GM/DL (ref 14–18)
IMM GRANULOCYTES # BLD AUTO: 0.04 THOU/MM3 (ref 0–0.07)
IMM GRANULOCYTES NFR BLD AUTO: 0.7 %
LYMPHOCYTES ABSOLUTE: 1.1 THOU/MM3 (ref 1–4.8)
LYMPHOCYTES NFR BLD AUTO: 18.4 %
MCH RBC QN AUTO: 32 PG (ref 26–33)
MCHC RBC AUTO-ENTMCNC: 34.4 GM/DL (ref 32.2–35.5)
MCV RBC AUTO: 93.1 FL (ref 80–94)
MONOCYTES ABSOLUTE: 0.7 THOU/MM3 (ref 0.4–1.3)
MONOCYTES NFR BLD AUTO: 11.5 %
NEUTROPHILS ABSOLUTE: 3.9 THOU/MM3 (ref 1.8–7.7)
NEUTROPHILS NFR BLD AUTO: 65.8 %
NRBC BLD AUTO-RTO: 0 /100 WBC
PLATELET # BLD AUTO: 128 THOU/MM3 (ref 130–400)
PMV BLD AUTO: 10.5 FL (ref 9.4–12.4)
POTASSIUM SERPL-SCNC: 4.7 MEQ/L (ref 3.5–5.2)
RBC # BLD AUTO: 4.62 MILL/MM3 (ref 4.7–6.1)
SODIUM SERPL-SCNC: 142 MEQ/L (ref 135–145)
T4 FREE SERPL-MCNC: 0.9 NG/DL (ref 0.92–1.68)
TSH SERPL DL<=0.05 MIU/L-ACNC: 1.5 UIU/ML (ref 0.27–4.2)
WBC # BLD AUTO: 6 THOU/MM3 (ref 4.8–10.8)

## 2025-06-25 PROCEDURE — 85025 COMPLETE CBC W/AUTO DIFF WBC: CPT

## 2025-06-25 PROCEDURE — 1200000000 HC SEMI PRIVATE

## 2025-06-25 PROCEDURE — 6370000000 HC RX 637 (ALT 250 FOR IP): Performed by: STUDENT IN AN ORGANIZED HEALTH CARE EDUCATION/TRAINING PROGRAM

## 2025-06-25 PROCEDURE — 84443 ASSAY THYROID STIM HORMONE: CPT

## 2025-06-25 PROCEDURE — 2500000003 HC RX 250 WO HCPCS

## 2025-06-25 PROCEDURE — 93005 ELECTROCARDIOGRAM TRACING: CPT | Performed by: STUDENT IN AN ORGANIZED HEALTH CARE EDUCATION/TRAINING PROGRAM

## 2025-06-25 PROCEDURE — 99233 SBSQ HOSP IP/OBS HIGH 50: CPT | Performed by: STUDENT IN AN ORGANIZED HEALTH CARE EDUCATION/TRAINING PROGRAM

## 2025-06-25 PROCEDURE — 6360000002 HC RX W HCPCS

## 2025-06-25 PROCEDURE — 84439 ASSAY OF FREE THYROXINE: CPT

## 2025-06-25 PROCEDURE — 36415 COLL VENOUS BLD VENIPUNCTURE: CPT

## 2025-06-25 PROCEDURE — 80048 BASIC METABOLIC PNL TOTAL CA: CPT

## 2025-06-25 PROCEDURE — 93010 ELECTROCARDIOGRAM REPORT: CPT | Performed by: INTERNAL MEDICINE

## 2025-06-25 RX ADMIN — MEMANTINE 10 MG: 10 TABLET ORAL at 20:37

## 2025-06-25 RX ADMIN — MEMANTINE 10 MG: 10 TABLET ORAL at 08:55

## 2025-06-25 RX ADMIN — Medication 3 MG: at 20:37

## 2025-06-25 RX ADMIN — DONEPEZIL HYDROCHLORIDE 10 MG: 10 TABLET, FILM COATED ORAL at 20:37

## 2025-06-25 RX ADMIN — SODIUM CHLORIDE, PRESERVATIVE FREE 10 ML: 5 INJECTION INTRAVENOUS at 08:56

## 2025-06-25 RX ADMIN — ENOXAPARIN SODIUM 40 MG: 100 INJECTION SUBCUTANEOUS at 08:56

## 2025-06-25 RX ADMIN — FUROSEMIDE 20 MG: 20 TABLET ORAL at 08:56

## 2025-06-25 RX ADMIN — SODIUM CHLORIDE, PRESERVATIVE FREE 10 ML: 5 INJECTION INTRAVENOUS at 20:37

## 2025-06-25 RX ADMIN — ESCITALOPRAM OXALATE 15 MG: 10 TABLET ORAL at 08:55

## 2025-06-25 NOTE — PALLIATIVE CARE
Follow Up / Progress Note        Patient:   Jayden Cadet  YOB: 1950  Age:  74 y.o.  Room:  [address] [place] [address]  MRN:  [id number]         Advance Care Planning     Palliative Team Advance Care Planning (ACP) Conversation    Date of Conversation: 06/25/25    Individuals present for the conversation: Patient and Son Anshu     ACP documents on file prior to discussion:  -None    Previously completed document/s not on file: Medical POA document was completed previously but it is not available for review. This writer requested a copy of the document for patient's chart.     Healthcare Decision Maker:    Primary Decision Maker: Diego Cadet - Child - [phone]    Secondary Decision Maker: Pelon Cadet - Child - [phone]     Conversation Summary:  Primary RN calledPatient sitting up in the chair.  Patient's son Anshu is at the bedside.  Palliative care introduced.  Anshu shares that the patient is  ( in 2017) and patient has only 2 children.  Anshu indicates that he is the medical POA and this paperwork was completed at [facility].  Discussed patient's current code status and that this RN  had completed an [place] DNR form to represent this.  Discussed DNR CC and what this entails and when this is appropriate.  Anshu shares that he would not want any aggressive measures done and would prefer the goals of care to be comfort for his father.  Discussed what DNR CC is and this aligns with patient/family wishes.  [place] D[place] completed and copy provided to Anshu, copy faxed to medical records and copy and original placed in chart cubby.    Phone call made to [facility] and copy of patient's medical POA is received which does indicate that Anshu is medical POA.  Copy placed on paper chart and copy faxed to medical records.    Updated Dr. Chaidez and order received for code status change.    Resuscitation Status: DNR CC    Documentation Completed:  -Portable DNR and -Other medical POA 
primary RN, Tatum and she will call when family arrives.    I spent 15 minutes with the patient and/or surrogate decision maker discussing the patient's wishes and goals.      Shayla Velazquez RN              Electronically signed by Shayla Velazquez RN on 6/25/2025 at 10:29 AM           Palliative Care Office: [phone]

## 2025-06-25 NOTE — PLAN OF CARE
Problem: Discharge Planning  Goal: Discharge to home or other facility with appropriate resources  6/24/2025 2346 by Sara Sainz, RN  Outcome: Progressing  Flowsheets (Taken 6/24/2025 1719 by Diane Whittington, RN)  Discharge to home or other facility with appropriate resources:   Identify barriers to discharge with patient and caregiver   Arrange for needed discharge resources and transportation as appropriate   Identify discharge learning needs (meds, wound care, etc)     Problem: Skin/Tissue Integrity  Goal: Skin integrity remains intact  Description: 1.  Monitor for areas of redness and/or skin breakdown  2.  Assess vascular access sites hourly  3.  Every 4-6 hours minimum:  Change oxygen saturation probe site  4.  Every 4-6 hours:  If on nasal continuous positive airway pressure, respiratory therapy assess nares and determine need for appliance change or resting period  6/24/2025 2346 by Sara Sainz, RN  Outcome: Progressing  Flowsheets (Taken 6/24/2025 2346)  Skin Integrity Remains Intact: Monitor for areas of redness and/or skin breakdown     Problem: Safety - Adult  Goal: Free from fall injury  6/24/2025 2346 by Sara Sainz, RN  Outcome: Progressing  Flowsheets (Taken 6/24/2025 2346)  Free From Fall Injury: Instruct family/caregiver on patient safety

## 2025-06-25 NOTE — DISCHARGE INSTR - COC
Continuity of Care Form    Patient Name: Jayden Cadet   :  1950  MRN:  [id number]    Admit date:  2025  Discharge date:  2025    Code Status Order: DNR-CC   Advance Directives:     Admitting Physician:  No admitting provider for patient encounter.  PCP: [facility], [facility]    Discharging Nurse: Fabricio REARDON  Discharging Hospital Unit/Room#: 6K-17/017-A  Discharging Unit Phone Number: [phone]    Emergency Contact:   Extended Emergency Contact Information  Primary Emergency Contact: Pelon Cadet   [facility]  Home Phone: [phone]  Relation: Child  Secondary Emergency Contact: Diego Cadet   [facility]  Home Phone: [phone]  Relation: Child    Past Surgical History:  Past Surgical History:   Procedure Laterality Date    KIDNEY SURGERY      SHOULDER SURGERY Left        Immunization History:   Immunization History   Administered Date(s) Administered    COVID-19, PFIZER PURPLE top, DILUTE for use, (age 12 y+), 30mcg/0.3mL 2025    Influenza Virus Vaccine 2015       Active Problems:  Patient Active Problem List   Diagnosis Code    Kidney stones N20.0    Edema of hand R60.0    History of renal stone Z87.442    Benign prostatic hyperplasia without lower urinary tract symptoms N40.0    Microscopic hematuria R31.29    Constipation K59.00    Rectal bleeding K62.5    Diverticulosis K57.90    Other forms of angina pectoris I20.89    Fall at home, initial encounter W19.XXXA, Y92.009    Falls R29.6    Hallucinations R44.3    Dementia with behavioral disturbance (HCC) F03.918    Swelling of both lower extremities M79.89    History of gout Z87.39    Primary hypertension I10    History of tobacco abuse Z87.891    Severe malnutrition E43    Sinus bradycardia R00.1       Isolation/Infection:   Isolation            No Isolation          Patient Infection Status    No active infections.   Resolved       Infection Onset Added Last Indicated Last Indicated By Resolved Resolved

## 2025-06-25 NOTE — PLAN OF CARE
Problem: Discharge Planning  Goal: Discharge to home or other facility with appropriate resources  Outcome: Progressing  Flowsheets (Taken 6/25/2025 1544)  Discharge to home or other facility with appropriate resources: Identify barriers to discharge with patient and caregiver     Problem: Skin/Tissue Integrity  Goal: Skin integrity remains intact  Description: 1.  Monitor for areas of redness and/or skin breakdown  2.  Assess vascular access sites hourly  3.  Every 4-6 hours minimum:  Change oxygen saturation probe site  4.  Every 4-6 hours:  If on nasal continuous positive airway pressure, respiratory therapy assess nares and determine need for appliance change or resting period  Outcome: Progressing  Flowsheets (Taken 6/25/2025 1544)  Skin Integrity Remains Intact: Monitor for areas of redness and/or skin breakdown     Problem: Safety - Adult  Goal: Free from fall injury  Outcome: Progressing  Flowsheets (Taken 6/25/2025 1544)  Free From Fall Injury: Instruct family/caregiver on patient safety     Problem: Nutrition Deficit:  Goal: Optimize nutritional status  Outcome: Progressing  Flowsheets (Taken 6/25/2025 1544)  Nutrient intake appropriate for improving, restoring, or maintaining nutritional needs: Assess nutritional status and recommend course of action   Care plan reviewed with patient and son.  Patient and son verbalize understanding of the plan of care and contribute to goal setting.

## 2025-06-25 NOTE — CARE COORDINATION
6/25/25, 11:47 AM EDT    DISCHARGE ON GOING EVALUATION    [facility] day: 2  Location: -17/017-A Reason for admit: Lower extremity edema [R60.0]  Sinus bradycardia [R00.1]  Falls [R29.6]  Fall at home, initial encounter [W19.XXXA, Y92.009]  Dementia with psychotic disturbance, unspecified dementia severity, unspecified dementia type (HCC) [F03.92]     Procedures: 6/23 echo: EF 55-65%     Imaging since last note: n/a     Barriers to Discharge: Remains sinus marzena. PT/OT. ECF referral pending.     PCP: No, Pcp  Readmission Risk Score: 13.6    Patient Goals/Plan/Treatment Preferences: referral made to [facility].

## 2025-06-25 NOTE — CONSULTS
Comprehensive Nutrition Assessment    Type and Reason for Visit: Initial, Consult (Poor intake/ appetite)    Nutrition Recommendations/Plan:   Continue diet as ordered.   Continue Ensure Plus TID.   Consider appetite stimulant.   Consider MVI.      Malnutrition Assessment:  Malnutrition Status: Severe malnutrition  Context: Chronic Illness       Findings of the 6 clinical characteristics of malnutrition:  Energy Intake:  75% or less estimated energy requirements for 1 month or longer  Weight Loss:  Unable to assess (pt does not know UBW and no recent weights in EMR)     Body Fat Loss:  Severe body fat loss Orbital, Triceps   Muscle Mass Loss:  Severe muscle mass loss Temples (temporalis), Clavicles (pectoralis & deltoids), Calf (gastrocnemius), Hand (interosseous)  Fluid Accumulation:  Severe Extremities   Strength:  Not Performed    Nutrition Assessment:    Pt. severely malnourished AEB criteria listed above. At risk for further nutritional compromise r/t admitted d/t hallucinations and aggression towards other residents at his AL- psychiatry following working on geripsych placement. Noted underlying medical condition (PMHx Gout, dementia, HTN, kidney stone, pneumothorax on right).    Nutrition Related Findings:    Pt. Report/Treatments/Miscellaneous: Patient seen, sitting up in chair in room. He reports that his appetite is good, but the amount of food that comes on his trays is much more than he can eat. He reports that at his AL he is served 3 meals per day, but that he does not eat all of the meal. He denies any nausea/ vomiting/ diarrhea/ constipation. He reports that he drinks some of the Ensure, but not all.   GI Status: Last BM PTA  Wound: None   Edema:  +3 pitting b/l LE edema, per flowsheet   Pertinent Labs:   No results found for: \"GLUF\", \"LABA1C\"  Recent Labs     06/23/25 2052 06/24/25  1024 06/25/25  0606    143 142   K 4.0 4.4 4.7    104 105   GLUCOSE 131* 84 84   BUN 18 14 12

## 2025-06-25 NOTE — PROCEDURES
PROCEDURE NOTE  Date: 6/25/2025   Name: Jayden Cadet  YOB: 1950    Procedures    12 lead EKG completed. Results handed to Sara REARDON.

## 2025-06-25 NOTE — CARE COORDINATION
6/25/25, 2:01 PM EDT    DISCHARGE PLANNING EVALUATION    JOHNNY spoke with Lila with [facility], they have accepted pt on Thursday.  JOHNNY updated pts son Anshu.  Anshu can transport tomorrow after 3 pm.

## 2025-06-26 ENCOUNTER — APPOINTMENT (OUTPATIENT)
Dept: CT IMAGING | Age: 75
DRG: 884 | End: 2025-06-26
Payer: MEDICARE

## 2025-06-26 PROBLEM — R94.01 ABNORMAL EEG: Status: ACTIVE | Noted: 2025-06-26

## 2025-06-26 LAB
ANION GAP SERPL CALC-SCNC: 10 MEQ/L (ref 8–16)
BASOPHILS ABSOLUTE: 0.1 THOU/MM3 (ref 0–0.1)
BASOPHILS NFR BLD AUTO: 1.3 %
BUN SERPL-MCNC: 16 MG/DL (ref 8–23)
CALCIUM SERPL-MCNC: 9.6 MG/DL (ref 8.8–10.2)
CHLORIDE SERPL-SCNC: 105 MEQ/L (ref 98–111)
CO2 SERPL-SCNC: 28 MEQ/L (ref 22–29)
CREAT SERPL-MCNC: 1 MG/DL (ref 0.7–1.2)
DEPRECATED RDW RBC AUTO: 42.5 FL (ref 35–45)
EOSINOPHIL NFR BLD AUTO: 2.9 %
EOSINOPHILS ABSOLUTE: 0.2 THOU/MM3 (ref 0–0.4)
ERYTHROCYTE [DISTWIDTH] IN BLOOD BY AUTOMATED COUNT: 12.5 % (ref 11.5–14.5)
GFR SERPL CREATININE-BSD FRML MDRD: 78 ML/MIN/1.73M2
GLUCOSE BLD STRIP.AUTO-MCNC: 93 MG/DL (ref 70–108)
GLUCOSE SERPL-MCNC: 85 MG/DL (ref 74–109)
HCT VFR BLD AUTO: 47.6 % (ref 42–52)
HGB BLD-MCNC: 15.9 GM/DL (ref 14–18)
IMM GRANULOCYTES # BLD AUTO: 0.07 THOU/MM3 (ref 0–0.07)
IMM GRANULOCYTES NFR BLD AUTO: 1.3 %
LYMPHOCYTES ABSOLUTE: 1.3 THOU/MM3 (ref 1–4.8)
LYMPHOCYTES NFR BLD AUTO: 23.9 %
MCH RBC QN AUTO: 31.4 PG (ref 26–33)
MCHC RBC AUTO-ENTMCNC: 33.4 GM/DL (ref 32.2–35.5)
MCV RBC AUTO: 93.9 FL (ref 80–94)
MONOCYTES ABSOLUTE: 0.7 THOU/MM3 (ref 0.4–1.3)
MONOCYTES NFR BLD AUTO: 11.9 %
NEUTROPHILS ABSOLUTE: 3.2 THOU/MM3 (ref 1.8–7.7)
NEUTROPHILS NFR BLD AUTO: 58.7 %
NRBC BLD AUTO-RTO: 0 /100 WBC
PLATELET # BLD AUTO: 144 THOU/MM3 (ref 130–400)
PMV BLD AUTO: 10.5 FL (ref 9.4–12.4)
POTASSIUM SERPL-SCNC: 4.8 MEQ/L (ref 3.5–5.2)
RBC # BLD AUTO: 5.07 MILL/MM3 (ref 4.7–6.1)
SODIUM SERPL-SCNC: 143 MEQ/L (ref 135–145)
WBC # BLD AUTO: 5.5 THOU/MM3 (ref 4.8–10.8)

## 2025-06-26 PROCEDURE — 85025 COMPLETE CBC W/AUTO DIFF WBC: CPT

## 2025-06-26 PROCEDURE — 82948 REAGENT STRIP/BLOOD GLUCOSE: CPT

## 2025-06-26 PROCEDURE — 99233 SBSQ HOSP IP/OBS HIGH 50: CPT | Performed by: STUDENT IN AN ORGANIZED HEALTH CARE EDUCATION/TRAINING PROGRAM

## 2025-06-26 PROCEDURE — 1200000000 HC SEMI PRIVATE

## 2025-06-26 PROCEDURE — 95816 EEG AWAKE AND DROWSY: CPT | Performed by: PSYCHIATRY & NEUROLOGY

## 2025-06-26 PROCEDURE — 70450 CT HEAD/BRAIN W/O DYE: CPT

## 2025-06-26 PROCEDURE — 36415 COLL VENOUS BLD VENIPUNCTURE: CPT

## 2025-06-26 PROCEDURE — 80048 BASIC METABOLIC PNL TOTAL CA: CPT

## 2025-06-26 PROCEDURE — 2500000003 HC RX 250 WO HCPCS

## 2025-06-26 PROCEDURE — 95816 EEG AWAKE AND DROWSY: CPT

## 2025-06-26 PROCEDURE — 6360000002 HC RX W HCPCS

## 2025-06-26 PROCEDURE — 6370000000 HC RX 637 (ALT 250 FOR IP): Performed by: STUDENT IN AN ORGANIZED HEALTH CARE EDUCATION/TRAINING PROGRAM

## 2025-06-26 RX ADMIN — MEMANTINE 10 MG: 10 TABLET ORAL at 20:25

## 2025-06-26 RX ADMIN — SODIUM CHLORIDE, PRESERVATIVE FREE 10 ML: 5 INJECTION INTRAVENOUS at 08:21

## 2025-06-26 RX ADMIN — ESCITALOPRAM OXALATE 15 MG: 10 TABLET ORAL at 08:21

## 2025-06-26 RX ADMIN — ENOXAPARIN SODIUM 40 MG: 100 INJECTION SUBCUTANEOUS at 08:21

## 2025-06-26 RX ADMIN — DONEPEZIL HYDROCHLORIDE 10 MG: 10 TABLET, FILM COATED ORAL at 20:25

## 2025-06-26 RX ADMIN — Medication 3 MG: at 20:25

## 2025-06-26 RX ADMIN — MEMANTINE 10 MG: 10 TABLET ORAL at 08:21

## 2025-06-26 RX ADMIN — SODIUM CHLORIDE, PRESERVATIVE FREE 10 ML: 5 INJECTION INTRAVENOUS at 20:25

## 2025-06-26 NOTE — PROCEDURES
EEG REPORT       Patient: Jayden Cadet Age: 74 y.o.  MRN: [id number]      Referring Provider: No ref. provider found    History: This routine 20 minute scalp EEG was recorded with video- monitoring for a 74 y.o.. male  who had an episode concerning for seizure. This EEG was performed to evaluate for focal and epileptiform abnormalities.     Jayden Cadet   Current Facility-Administered Medications   Medication Dose Route Frequency Provider Last Rate Last Admin    donepezil (ARICEPT) tablet 10 mg  10 mg Oral Nightly Behl, Ashita, MD   10 mg at 06/25/25 2037    furosemide (LASIX) tablet 20 mg  20 mg Oral Q48H Behl, Ashita, MD   20 mg at 06/25/25 0856    melatonin tablet 3 mg  3 mg Oral Daily Behl, Ashita, MD   3 mg at 06/25/25 2037    memantine (NAMENDA) tablet 10 mg  10 mg Oral BID Behl, Ashita, MD   10 mg at 06/26/25 0821    escitalopram (LEXAPRO) tablet 15 mg  15 mg Oral Daily Behl, Ashita, MD   15 mg at 06/26/25 0821    sodium chloride flush 0.9 % injection 5-40 mL  5-40 mL IntraVENous 2 times per day Daiss, Ahmet, DO   10 mL at 06/26/25 0821    sodium chloride flush 0.9 % injection 5-40 mL  5-40 mL IntraVENous PRN Rajan, Ahmet, DO        0.9 % sodium chloride infusion   IntraVENous PRN Daiss, Ahmet, DO        potassium chloride (KLOR-CON M) extended release tablet 40 mEq  40 mEq Oral PRN Daiss, Ahmet, DO        Or    potassium bicarb-citric acid (EFFER-K) effervescent tablet 40 mEq  40 mEq Oral PRN Daiss, Ahmet, DO        Or    potassium chloride 10 mEq/100 mL IVPB (Peripheral Line)  10 mEq IntraVENous PRN Nikkiss, Ahmet, DO        magnesium sulfate 2000 mg in 50 mL IVPB premix  2,000 mg IntraVENous PRN Daiss, Ahmet, DO        enoxaparin (LOVENOX) injection 40 mg  40 mg SubCUTAneous Daily Daiss, Ahmet, DO   40 mg at 06/26/25 0821    ondansetron (ZOFRAN-ODT) disintegrating tablet 4 mg  4 mg Oral Q8H PRN Ahmet Tolentino DO        Or    ondansetron (ZOFRAN) injection 4 mg  4 mg IntraVENous Q6H PRN

## 2025-06-26 NOTE — CARE COORDINATION
6/26/25, 8:33 AM EDT    DISCHARGE ON GOING EVALUATION    [facility] day: 3  Location: -17/017-A Reason for admit: Lower extremity edema [R60.0]  Sinus bradycardia [R00.1]  Falls [R29.6]  Fall at home, initial encounter [W19.XXXA, Y92.009]  Dementia with psychotic disturbance, unspecified dementia severity, unspecified dementia type (HCC) [F03.92]     Procedures:   6/23 echo: EF 55-65%     Imaging since last note: none    Barriers to Discharge: PT/OT, Palliative Care eval complete, Dietician following. Lovenox, Lasix, Zofran prn, electrolyte replacement protocols.     PCP: No, Pcp  Readmission Risk Score: 12.9    Patient Goals/Plan/Treatment Preferences: Hilario Allison. SW following.

## 2025-06-26 NOTE — CARE COORDINATION
6/26/25, 9:19 AM EDT    Patient goals/plan/ treatment preferences discussed by  and .  Patient goals/plan/ treatment preferences reviewed with patient/ family.  Patient/ family verbalize understanding of discharge plan and are in agreement with goal/plan/treatment preferences.  Understanding was demonstrated using the teach back method.  AVS provided by RN at time of discharge, which includes all necessary medical information pertaining to the patients current course of illness, treatment, post-discharge goals of care, and treatment preferences.     Services At/After Discharge: Skilled Nursing Facility (SNF), Aide services, Nursing service, OT, and PT    Pt is being discharged today to [facility] under his medicare.    Pts jodi Brasher will transport after 3 pm.    JOHNNY notified Lila at [facility].      Blue Packet on chart with discharge instructions, RN is aware.      1:07 PM discharge is being held for today.  Son is aware.  JOHNNY notified Lila with [facility]

## 2025-06-26 NOTE — PLAN OF CARE
Problem: Discharge Planning  Goal: Discharge to home or other facility with appropriate resources  6/25/2025 2158 by Agueda Quinones, RN  Outcome: Progressing     Problem: Skin/Tissue Integrity  Goal: Skin integrity remains intact  Description: 1.  Monitor for areas of redness and/or skin breakdown  2.  Assess vascular access sites hourly  3.  Every 4-6 hours minimum:  Change oxygen saturation probe site  4.  Every 4-6 hours:  If on nasal continuous positive airway pressure, respiratory therapy assess nares and determine need for appliance change or resting period  6/25/2025 2158 by Agueda Quinones, RN  Outcome: Progressing     Problem: Safety - Adult  Goal: Free from fall injury  6/25/2025 2158 by Agueda Quinones, RN  Outcome: Progressing     Problem: Nutrition Deficit:  Goal: Optimize nutritional status  6/25/2025 2158 by Agueda Quinones, RN  Outcome: Progressing

## 2025-06-27 VITALS
HEART RATE: 57 BPM | BODY MASS INDEX: 23.28 KG/M2 | OXYGEN SATURATION: 100 % | DIASTOLIC BLOOD PRESSURE: 64 MMHG | RESPIRATION RATE: 17 BRPM | TEMPERATURE: 93.1 F | SYSTOLIC BLOOD PRESSURE: 144 MMHG | WEIGHT: 153.6 LBS | HEIGHT: 68 IN

## 2025-06-27 LAB
ANION GAP SERPL CALC-SCNC: 10 MEQ/L (ref 8–16)
BASOPHILS ABSOLUTE: 0.1 THOU/MM3 (ref 0–0.1)
BASOPHILS NFR BLD AUTO: 1.3 %
BUN SERPL-MCNC: 13 MG/DL (ref 8–23)
CALCIUM SERPL-MCNC: 9.4 MG/DL (ref 8.8–10.2)
CHLORIDE SERPL-SCNC: 105 MEQ/L (ref 98–111)
CO2 SERPL-SCNC: 27 MEQ/L (ref 22–29)
CREAT SERPL-MCNC: 1 MG/DL (ref 0.7–1.2)
DEPRECATED RDW RBC AUTO: 42.2 FL (ref 35–45)
EOSINOPHIL NFR BLD AUTO: 2.1 %
EOSINOPHILS ABSOLUTE: 0.1 THOU/MM3 (ref 0–0.4)
ERYTHROCYTE [DISTWIDTH] IN BLOOD BY AUTOMATED COUNT: 12.3 % (ref 11.5–14.5)
GFR SERPL CREATININE-BSD FRML MDRD: 78 ML/MIN/1.73M2
GLUCOSE SERPL-MCNC: 92 MG/DL (ref 74–109)
HCT VFR BLD AUTO: 49.4 % (ref 42–52)
HGB BLD-MCNC: 16.6 GM/DL (ref 14–18)
IMM GRANULOCYTES # BLD AUTO: 0.03 THOU/MM3 (ref 0–0.07)
IMM GRANULOCYTES NFR BLD AUTO: 0.5 %
LYMPHOCYTES ABSOLUTE: 1.4 THOU/MM3 (ref 1–4.8)
LYMPHOCYTES NFR BLD AUTO: 22.1 %
MCH RBC QN AUTO: 31.1 PG (ref 26–33)
MCHC RBC AUTO-ENTMCNC: 33.6 GM/DL (ref 32.2–35.5)
MCV RBC AUTO: 92.7 FL (ref 80–94)
MONOCYTES ABSOLUTE: 0.7 THOU/MM3 (ref 0.4–1.3)
MONOCYTES NFR BLD AUTO: 10.5 %
NEUTROPHILS ABSOLUTE: 4 THOU/MM3 (ref 1.8–7.7)
NEUTROPHILS NFR BLD AUTO: 63.5 %
NRBC BLD AUTO-RTO: 0 /100 WBC
PLATELET # BLD AUTO: 161 THOU/MM3 (ref 130–400)
PMV BLD AUTO: 10.5 FL (ref 9.4–12.4)
POTASSIUM SERPL-SCNC: 3.7 MEQ/L (ref 3.5–5.2)
RBC # BLD AUTO: 5.33 MILL/MM3 (ref 4.7–6.1)
SODIUM SERPL-SCNC: 142 MEQ/L (ref 135–145)
WBC # BLD AUTO: 6.3 THOU/MM3 (ref 4.8–10.8)

## 2025-06-27 PROCEDURE — 92523 SPEECH SOUND LANG COMPREHEN: CPT

## 2025-06-27 PROCEDURE — 2500000003 HC RX 250 WO HCPCS

## 2025-06-27 PROCEDURE — 80048 BASIC METABOLIC PNL TOTAL CA: CPT

## 2025-06-27 PROCEDURE — 36415 COLL VENOUS BLD VENIPUNCTURE: CPT

## 2025-06-27 PROCEDURE — 6370000000 HC RX 637 (ALT 250 FOR IP): Performed by: STUDENT IN AN ORGANIZED HEALTH CARE EDUCATION/TRAINING PROGRAM

## 2025-06-27 PROCEDURE — 97129 THER IVNTJ 1ST 15 MIN: CPT

## 2025-06-27 PROCEDURE — 99239 HOSP IP/OBS DSCHRG MGMT >30: CPT | Performed by: STUDENT IN AN ORGANIZED HEALTH CARE EDUCATION/TRAINING PROGRAM

## 2025-06-27 PROCEDURE — 85025 COMPLETE CBC W/AUTO DIFF WBC: CPT

## 2025-06-27 PROCEDURE — 6360000002 HC RX W HCPCS

## 2025-06-27 RX ADMIN — ENOXAPARIN SODIUM 40 MG: 100 INJECTION SUBCUTANEOUS at 08:03

## 2025-06-27 RX ADMIN — ESCITALOPRAM OXALATE 15 MG: 10 TABLET ORAL at 08:02

## 2025-06-27 RX ADMIN — SODIUM CHLORIDE, PRESERVATIVE FREE 10 ML: 5 INJECTION INTRAVENOUS at 08:02

## 2025-06-27 RX ADMIN — FUROSEMIDE 20 MG: 20 TABLET ORAL at 10:22

## 2025-06-27 RX ADMIN — MEMANTINE 10 MG: 10 TABLET ORAL at 08:02

## 2025-06-27 NOTE — PLAN OF CARE
Problem: Discharge Planning  Goal: Discharge to home or other facility with appropriate resources  6/27/2025 1000 by Nat Valdez RN  Outcome: Progressing  Flowsheets (Taken 6/27/2025 1000)  Discharge to home or other facility with appropriate resources:   Identify barriers to discharge with patient and caregiver   Identify discharge learning needs (meds, wound care, etc)     Problem: Skin/Tissue Integrity  Goal: Skin integrity remains intact  Description: 1.  Monitor for areas of redness and/or skin breakdown  2.  Assess vascular access sites hourly  3.  Every 4-6 hours minimum:  Change oxygen saturation probe site  4.  Every 4-6 hours:  If on nasal continuous positive airway pressure, respiratory therapy assess nares and determine need for appliance change or resting period  6/27/2025 1000 by Nat Valdez RN  Outcome: Progressing  Flowsheets (Taken 6/27/2025 1000)  Skin Integrity Remains Intact:   Monitor for areas of redness and/or skin breakdown   Assess vascular access sites hourly  Note: No new signs or symptoms of skin breakdown noted this shift, encouraging patient to turn and reposition self in bed q2h      Problem: Safety - Adult  Goal: Free from fall injury  6/27/2025 1000 by Nat Valdez RN  Outcome: Progressing  Flowsheets (Taken 6/27/2025 1000)  Free From Fall Injury:   Instruct family/caregiver on patient safety   Based on caregiver fall risk screen, instruct family/caregiver to ask for assistance with transferring infant if caregiver noted to have fall risk factors  Note: No falls noted this shift. Continue falling star program. Bed alarm on, bed in low position. Call light and personal belongings in reach.  Patient uses call light appropriately.      Problem: Nutrition Deficit:  Goal: Optimize nutritional status  6/27/2025 1000 by Nat Valdez RN  Outcome: Progressing  Flowsheets (Taken 6/27/2025 1000)  Nutrient intake appropriate for improving, restoring, or maintaining nutritional needs:

## 2025-06-27 NOTE — PROGRESS NOTES
Hospitalist Progress Note      Patient:  Jayden Cadet    Unit/Bed:6K-17/017-A  YOB: 1950  MRN: [id number]   Acct: [id number]   PCP: Jeannie, Pcp  Date of Admission: 6/23/2025    ASSESSMENT AND PLAN    Active Problems  Hallucinations and aggression ISO progressive dementia: Patient presented via EMS secondary to hallucinations and aggression towards other residents at his assisted living.  Family states that the dementia has been progressive over the last few years. They state that he began having hallucinations in January. They state that he has become more aggressive over the last couple weeks. They also note that he wanders out of the facility. They do not feel like he is safe enough in assisted living and would like SNF placement. Patient is alert and oriented to self. Patient had dementia screening the other day that noted to score of 7/30. CT head noting cerebral volume loss. No other acute findings. UA negative. CXR negative.  Patient has no other focal complaints. Psychiatry consulted for consideration of [facility] psych placement, they recommend locked dementia unit, social work assisting in this matter.  Goals of care: Palliative care following. Discussed with patient's POA, his son who would like to change code status to DNR-CC.   Bradycardia: Noted. Discussed with son who would like to hold any cardio consults at this time. He states unless it gets worse then will consider aggressive care. HR in the 50s today.   Chronic Conditions (reviewed and stable unless otherwise stated)   Chronic BLE swelling: Chronic venous stasis versus cardiac etiology.  Last echo 2011 noting LVEF 55 to 60%. On Lasix 20 mg daily outpatient. Echocardiogram showed EF 60-65%.   Gout: On allopurinol 100 mg twice daily  Primary hypertension: Not currently on any antihypertensives.  BPH: On Flomax 0.4 mg.  Bladder scan ordered.  History of tobacco 
Comprehensive Nutrition Assessment    Type and Reason for Visit:  Reassess (malnutrition, po/supplement)    Nutrition Recommendations/Plan:   Recommend MVI and an appetite stimulant.   ONS modified to Ensure Clear daily and Magic Cup BID.    Continue current diet.       Malnutrition Assessment:  Malnutrition Status:  Severe malnutrition (06/25/25 1446)    Context:  Chronic Illness     Findings of the 6 clinical characteristics of malnutrition:  Energy Intake:  75% or less estimated energy requirements for 1 month or longer  Weight Loss:  Unable to assess (pt does not know UBW and no recent weights in EMR)     Body Fat Loss:  Severe body fat loss Orbital, Triceps   Muscle Mass Loss:  Severe muscle mass loss Temples (temporalis), Clavicles (pectoralis & deltoids), Calf (gastrocnemius), Hand (interosseous)  Fluid Accumulation:  Severe Extremities   Strength:  Not Performed    Nutrition Assessment:     Pt. severely malnourished AEB criteria listed above. At risk for further nutritional compromise r/t admitted d/t hallucinations and aggression towards other residents at his AL- psychiatry following working on geripsych placement. Noted underlying medical condition (PMHx Gout, dementia, HTN, kidney stone, pneumothorax on right).     Nutrition Related Findings:    Pt. Report/Treatments/Miscellaneous:Patient seen, unable to carry on a meaningful conversation. No family present. Nurse tech in his room, reports he didn't eat any breakfast today and his son brought in a kewpee and ate only ~ 1/4 of it.  Not drinking Ensure. States he thinks he likes juice and ice cream.   GI Status: BM 6/26/25  Pertinent Labs: 6/27/25: BMP wnls  Pertinent Meds: aricept, lexapro, lasix, namenda     Wound Type: None       Current Nutrition Intake & Therapies:    Average Meal Intake: 26-50%, 0%, %  Average Supplements Intake:  (not drinking per nurse tech, patient unable to state)  ADULT DIET; Regular  ADULT ORAL NUTRITION SUPPLEMENT; 
Discharge teaching and instructions for diagnosis/procedure of fall completed with patient using teachback method. AVS reviewed. Printed prescriptions given to patient. Patient voiced understanding regarding prescriptions, follow up appointments, and care of self at home. Discharged in a wheelchair to  skilled nursing per family   
Echo completed. Dr. Kwon to read.   
[facility]  INPATIENT OCCUPATIONAL THERAPY  STRZ RENAL TELEMETRY 6K  EVALUATION      Discharge Recommendations: Subacute/Skilled Nursing Facility  Equipment Recommendations:   monitor pending progress       Time In: 1408  Time Out: 1430  Timed Code Treatment Minutes: 12 Minutes  Minutes: 22          Date: 2025  Patient Name: Jayden Cadet,   Gender: male      MRN: [id number]  : 1950  (74 y.o.)  Referring Practitioner: Ahmet Tolentino DO  Diagnosis: Fall at home, initial encounter  Additional Pertinent Hx: Per MD H & P:74 y.o. male with PMHx of gout, BPH, tobacco abuse, dementia who presents to [facility] from assisted living with complaint of hallucinations and aggression towards others.  Patient presented via EMS secondary to hallucinations and aggression towards other residents at his assisted living.  Family states that the dementia has been progressive over the last few years.  They state that he began having hallucinations in January.  They state that he has become more aggressive over the last couple weeks.  They also note that he wanders out of the facility.  They do not feel like he is safe enough in assisted living and would like SNF placement.    Restrictions/Precautions:  Restrictions/Precautions: Fall Risk  Position Activity Restriction  Other Position/Activity Restrictions: hx of dementia    Subjective  Chart Reviewed: Yes, Orders, Progress Notes, History and Physical  Patient assessed for rehabilitation services?: Yes  Family / Caregiver Present: No    Subjective: cooperative, laying in bed upon arrival of therapist    Pain: 0/10: no c/o pain during session    Vitals: Vitals not assessed per clinical judgement, see nursing flowsheet    Social/Functional History:  Type of Home: Assisted living  Home Layout: One level  Home Equipment: Walker - Rolling           Prior Level of Assist for ADLs: Needs assistance  Homemaking Responsibilities: No           
Prayer and encouragement   06/24/25 1449   Encounter Summary   Encounter Overview/Reason Initial Encounter   Service Provided For Patient   Referral/Consult From Rounding   Support System Family members   Last Encounter  06/24/25   Complexity of Encounter Low   Begin Time 1335   End Time  1341   Total Time Calculated 6 min   Spiritual/Emotional needs   Type Spiritual Support   Assessment/Intervention/Outcome   Assessment Calm   Intervention Empowerment        
[facility]  SPEECH THERAPY  STRZ RENAL TELEMETRY 6K  Clinical Swallow Evaluation    Discharge Recommendations: SNF  DIET ORDER RECOMMENDATIONS AFTER EVALUATION: Regular with thin liquids.   Strategies:  Set-up with Meals and Intermittent Supervision     SLP Individual Minutes  Time In: 0131  Time Out: 0150  Minutes: 19          Date: 2025  Patient Name: Jayden Cadet      CSN: [id number]   : 1950  (74 y.o.)  Gender: male   Referring Physician:  Behl, Ashita, MD     Diagnosis: Fall at home, initial encounter    History of Present Illness/Injury: per chart review \" Jayden Cadet is a 74 y.o. male with PMHx of gout, BPH, tobacco abuse, dementia who presents to [facility] from assisted living with complaint of hallucinations and aggression towards others.  Patient presented via EMS secondary to hallucinations and aggression towards other residents at his assisted living.  Family states that the dementia has been progressive over the last few years.  They state that he began having hallucinations in January.  They state that he has become more aggressive over the last couple weeks.  They also note that he wanders out of the facility.  They do not feel like he is safe enough in assisted living and would like SNF placement.  Patient is alert and oriented to self.  Patient had dementia screening the other day that noted to score of 7/30.  CT head noting cerebral volume loss.  No other acute findings.  UA negative.  CXR negative.  Patient has no other focal complaints.\"      Past Medical History:   Diagnosis Date    Gout     Hypertension     Kidney stone     Pneumothorax on right        SUBJECTIVE:  RN, Bonnie approved evaluation this date.  Nurse states that he has been eating well this date.  Upon arrival in room patient was laying in bed with HOB up.  He had lunch tray in room with 100% of food gone.    OBJECTIVE:    Pain:  No pain reported.    Current Diet: Regular 
[facility]  SPEECH THERAPY  STRZ RENAL TELEMETRY 6K  Speech - Language - Cognitive Evaluation    Discharge Recommendations: SNF    SLP Individual Minutes  Time In: 922  Time Out: 944  Minutes: 22  Timed Code Treatment Minutes: 10 Minutes       Date: 2025  Patient Name: Jayden Cadet      CSN: [id number]   : 1950  (74 y.o.)  Gender: male   Referring Physician:  Irina Chaidez DO  Diagnosis: Fall at home, initial encounter  Precautions: fall precautions  History of Present Illness/Injury: Jayden Cadet is a 74 y.o. male with PMHx of gout, BPH, tobacco abuse, dementia who presents to [facility] from assisted living with complaint of hallucinations and aggression towards others.  Patient presented via EMS secondary to hallucinations and aggression towards other residents at his assisted living.  Family states that the dementia has been progressive over the last few years.  They state that he began having hallucinations in January.  They state that he has become more aggressive over the last couple weeks.  They also note that he wanders out of the facility.  They do not feel like he is safe enough in assisted living and would like SNF placement.  Patient is alert and oriented to self.  Patient had dementia screening the other day that noted to score of 7/30.  CT head noting cerebral volume loss.  No other acute findings.  UA negative.  CXR negative.  Patient has no other focal complaints.   Past Medical History:   Diagnosis Date    Gout     Hypertension     Kidney stone     Pneumothorax on right        Pain: No pain reported.    Subjective:  ALEXYS Johns approved ST evaluation this date.  Patient was upright in recliner upon ST entry, no family present at bedside.  Patient was confused but pleasant and cooperative this date.     SOCIAL HISTORY:   Living Arrangements: Assisted Living  Work History: Retired. Patient unable to report.   Education Level: Patient unable to 
[facility]  Neurodiagnostic Laboratory Technician Report  STRZ RENAL TELEMETRY 6K  Routine, Standard Test    Name: Jayden Cadet  : 1950  Age: 74 y.o.  Sex: male  MRN: [id number]  CSN: [id number]    Ordering Provider: Irina Chaidez DO      EEG Number: [id number]    Time In: Time In: 1545 (25)  Time Out: Time Out: 1605 (2025)  Total Treatment Time: Minutes: 20          Clinical History: Hx of dementia. Patient had an episode of unresponsiveness. Concern for seizure.     Past Medical History:       Diagnosis Date    Gout     Hypertension     Kidney stone     Pneumothorax on right        Medications:   Prior to Admission medications    Medication Sig Start Date End Date Taking? Authorizing Provider   escitalopram (LEXAPRO) 5 MG tablet Take 3 tablets by mouth daily 25  Yes Breanne Hernandez MD   donepezil (ARICEPT) 10 MG tablet Take 1 tablet by mouth nightly   Yes Breanne Hernandez MD   memantine (NAMENDA) 10 MG tablet Take 1 tablet by mouth 2 times daily   Yes Breanne Hernandez MD   escitalopram (LEXAPRO) 10 MG tablet Take 1 tablet by mouth daily   Yes Breanne Hernandez MD   melatonin 3 MG TABS tablet Take 1 tablet by mouth daily   Yes Breanne Hernandez MD   furosemide (LASIX) 20 MG tablet Take 1 tablet by mouth every 48 hours 22  Yes Adeline Kwon MD   LORazepam (ATIVAN) 0.5 MG tablet Take 1 tablet by mouth every 8 hours as needed for Anxiety. Max Daily Amount: 1.5 mg    ProviderBreanne MD   acetaminophen (TYLENOL) 325 MG tablet Take 2 tablets by mouth every 6 hours as needed for Pain    ProviderBreanne MD   docusate sodium (COLACE) 100 MG capsule Take 1 capsule by mouth 2 times daily    Breanne Hernandez MD   allopurinol (ZYLOPRIM) 100 MG tablet Take 1 tablet by mouth 2 times daily  Patient not taking: Reported on 2025   Rebecca Evans APRN - CNP   indomethacin (INDOCIN) 50 MG capsule Take 1 capsule by mouth 3 
facility use dose modulation techniques and iterative reconstructions, and/or weight-based dosing when appropriate to reduce radiation to a low as reasonably achievable.      Electronically signed by Irina Chaidez DO on 6/26/2025 at 5:14 PM   
[Time Spent: ___ minutes] : I have spent [unfilled] minutes of time on the encounter which excludes teaching and separately reported services.

## 2025-06-27 NOTE — PLAN OF CARE
Problem: Discharge Planning  Goal: Discharge to home or other facility with appropriate resources  Outcome: Progressing     Problem: Skin/Tissue Integrity  Goal: Skin integrity remains intact  Description: 1.  Monitor for areas of redness and/or skin breakdown  2.  Assess vascular access sites hourly  3.  Every 4-6 hours minimum:  Change oxygen saturation probe site  4.  Every 4-6 hours:  If on nasal continuous positive airway pressure, respiratory therapy assess nares and determine need for appliance change or resting period  Outcome: Progressing     Problem: Safety - Adult  Goal: Free from fall injury  Outcome: Progressing     Problem: Nutrition Deficit:  Goal: Optimize nutritional status  Outcome: Progressing

## 2025-06-27 NOTE — DISCHARGE SUMMARY
Hospitalist Discharge Note      Patient:  Jayden Cadet    Unit/Bed:6K-17/017-A  YOB: 1950  MRN: [id number]   Acct: [id number]     PCP: Jeannie Pcp  Date of Admission: 6/23/2025      Discharge date: 6/27/2025  3:54 PM    Chief Complaint on presentation :-Aggression    Discharge Assessment and Plan:-   Hallucinations and aggression ISO progressive dementia: Patient presented via EMS secondary to hallucinations and aggression towards other residents at his assisted living.  Family states that the dementia has been progressive over the last few years. They state that he began having hallucinations in January. They state that he has become more aggressive over the last couple weeks. They also note that he wanders out of the facility. They do not feel like he is safe enough in assisted living and would like SNF placement. Patient is alert and oriented to self. Patient had dementia screening the other day that noted to score of 7/30. CT head noting cerebral volume loss. No other acute findings. UA negative. CXR negative.  Patient has no other focal complaints. Psychiatry consulted for consideration of [facility] psych placement, they recommend locked dementia unit, PT/OT/SLP recommended SNF placement.  Social work/case management assisted with SNF placement and patient was discharged to SNF on 6/27/2025.  Goals of care: Palliative care were consulted and CODE STATUS was changed to DNR CC.  Bradycardia: patient was noted to be bradycardic during hospitalization, POA did not want workup for that either.  Of note, TSH within normal limit.  Unresponsive episode: Patient had episode of inability to talk but will follow commands during hospitalization, per POA, these episodes have been ongoing for the past few months.  EEG and CT head was negative.  Neurology consult was offered but POA did not want any additional workup.   Chronic Conditions (reviewed and stable unless otherwise

## 2025-06-28 LAB
REASON FOR REJECTION: NORMAL
REJECTED TEST: NORMAL

## 2025-07-14 ENCOUNTER — HOSPITAL ENCOUNTER (EMERGENCY)
Age: 75
Discharge: NURSING, REHAB OR LONG-TERM CARE | End: 2025-07-15
Attending: EMERGENCY MEDICINE
Payer: MEDICARE

## 2025-07-14 ENCOUNTER — APPOINTMENT (OUTPATIENT)
Dept: GENERAL RADIOLOGY | Age: 75
End: 2025-07-14
Payer: MEDICARE

## 2025-07-14 DIAGNOSIS — F03.918 DEMENTIA WITH AGGRESSIVE BEHAVIOR (HCC): Primary | ICD-10-CM

## 2025-07-14 LAB
ALBUMIN SERPL BCG-MCNC: 3.7 G/DL (ref 3.4–4.9)
ALP SERPL-CCNC: 88 U/L (ref 40–129)
ALT SERPL W/O P-5'-P-CCNC: 39 U/L (ref 10–50)
ANION GAP SERPL CALC-SCNC: 9 MEQ/L (ref 8–16)
APAP SERPL-MCNC: < 5 UG/ML (ref 10–30)
AST SERPL-CCNC: 37 U/L (ref 10–50)
BASOPHILS ABSOLUTE: 0.1 THOU/MM3 (ref 0–0.1)
BASOPHILS NFR BLD AUTO: 1.7 %
BILIRUB CONJ SERPL-MCNC: 0.2 MG/DL (ref 0–0.2)
BILIRUB SERPL-MCNC: 0.4 MG/DL (ref 0.3–1.2)
BUN SERPL-MCNC: 14 MG/DL (ref 8–23)
CALCIUM SERPL-MCNC: 9.3 MG/DL (ref 8.8–10.2)
CHLORIDE SERPL-SCNC: 104 MEQ/L (ref 98–111)
CO2 SERPL-SCNC: 29 MEQ/L (ref 22–29)
CREAT SERPL-MCNC: 1.2 MG/DL (ref 0.7–1.2)
DEPRECATED RDW RBC AUTO: 42.4 FL (ref 35–45)
EOSINOPHIL NFR BLD AUTO: 3.7 %
EOSINOPHILS ABSOLUTE: 0.2 THOU/MM3 (ref 0–0.4)
ERYTHROCYTE [DISTWIDTH] IN BLOOD BY AUTOMATED COUNT: 12.2 % (ref 11.5–14.5)
ETHANOL SERPL-MCNC: < 0.01 % (ref 0–0.08)
GFR SERPL CREATININE-BSD FRML MDRD: 62 ML/MIN/1.73M2
GLUCOSE SERPL-MCNC: 72 MG/DL (ref 74–109)
HCT VFR BLD AUTO: 41.8 % (ref 42–52)
HGB BLD-MCNC: 13.8 GM/DL (ref 14–18)
IMM GRANULOCYTES # BLD AUTO: 0.06 THOU/MM3 (ref 0–0.07)
IMM GRANULOCYTES NFR BLD AUTO: 1.2 %
LIPASE SERPL-CCNC: 25 U/L (ref 13–60)
LYMPHOCYTES ABSOLUTE: 1.3 THOU/MM3 (ref 1–4.8)
LYMPHOCYTES NFR BLD AUTO: 24.6 %
MCH RBC QN AUTO: 31.1 PG (ref 26–33)
MCHC RBC AUTO-ENTMCNC: 33 GM/DL (ref 32.2–35.5)
MCV RBC AUTO: 94.1 FL (ref 80–94)
MONOCYTES ABSOLUTE: 0.6 THOU/MM3 (ref 0.4–1.3)
MONOCYTES NFR BLD AUTO: 11.3 %
NEUTROPHILS ABSOLUTE: 3 THOU/MM3 (ref 1.8–7.7)
NEUTROPHILS NFR BLD AUTO: 57.5 %
NRBC BLD AUTO-RTO: 0 /100 WBC
OSMOLALITY SERPL CALC.SUM OF ELEC: 282.1 MOSMOL/KG (ref 275–300)
PLATELET # BLD AUTO: 165 THOU/MM3 (ref 130–400)
PMV BLD AUTO: 10.9 FL (ref 9.4–12.4)
POTASSIUM SERPL-SCNC: 4.3 MEQ/L (ref 3.5–5.2)
PROT SERPL-MCNC: 6 G/DL (ref 6.4–8.3)
RBC # BLD AUTO: 4.44 MILL/MM3 (ref 4.7–6.1)
SALICYLATES SERPL-MCNC: < 0.5 MG/DL (ref 2–10)
SODIUM SERPL-SCNC: 142 MEQ/L (ref 135–145)
TROPONIN, HIGH SENSITIVITY: 13 NG/L (ref 0–12)
WBC # BLD AUTO: 5.2 THOU/MM3 (ref 4.8–10.8)

## 2025-07-14 PROCEDURE — 80053 COMPREHEN METABOLIC PANEL: CPT

## 2025-07-14 PROCEDURE — 82248 BILIRUBIN DIRECT: CPT

## 2025-07-14 PROCEDURE — 85025 COMPLETE CBC W/AUTO DIFF WBC: CPT

## 2025-07-14 PROCEDURE — 84484 ASSAY OF TROPONIN QUANT: CPT

## 2025-07-14 PROCEDURE — 71046 X-RAY EXAM CHEST 2 VIEWS: CPT

## 2025-07-14 PROCEDURE — 99285 EMERGENCY DEPT VISIT HI MDM: CPT

## 2025-07-14 PROCEDURE — 82077 ASSAY SPEC XCP UR&BREATH IA: CPT

## 2025-07-14 PROCEDURE — 36415 COLL VENOUS BLD VENIPUNCTURE: CPT

## 2025-07-14 PROCEDURE — 93005 ELECTROCARDIOGRAM TRACING: CPT | Performed by: EMERGENCY MEDICINE

## 2025-07-14 PROCEDURE — 80143 DRUG ASSAY ACETAMINOPHEN: CPT

## 2025-07-14 PROCEDURE — 80179 DRUG ASSAY SALICYLATE: CPT

## 2025-07-14 PROCEDURE — 83690 ASSAY OF LIPASE: CPT

## 2025-07-14 ASSESSMENT — PAIN - FUNCTIONAL ASSESSMENT
PAIN_FUNCTIONAL_ASSESSMENT: NONE - DENIES PAIN
PAIN_FUNCTIONAL_ASSESSMENT: NONE - DENIES PAIN

## 2025-07-15 VITALS
HEART RATE: 48 BPM | OXYGEN SATURATION: 100 % | SYSTOLIC BLOOD PRESSURE: 129 MMHG | TEMPERATURE: 98 F | DIASTOLIC BLOOD PRESSURE: 73 MMHG | RESPIRATION RATE: 16 BRPM

## 2025-07-15 LAB
BILIRUB UR QL STRIP.AUTO: NEGATIVE
CHARACTER UR: CLEAR
COLOR, UA: YELLOW
EKG ATRIAL RATE: 45 BPM
EKG P AXIS: 89 DEGREES
EKG P-R INTERVAL: 224 MS
EKG Q-T INTERVAL: 484 MS
EKG QRS DURATION: 102 MS
EKG QTC CALCULATION (BAZETT): 418 MS
EKG R AXIS: -3 DEGREES
EKG T AXIS: 58 DEGREES
EKG VENTRICULAR RATE: 45 BPM
GLUCOSE UR QL STRIP.AUTO: NEGATIVE MG/DL
HGB UR QL STRIP.AUTO: NEGATIVE
KETONES UR QL STRIP.AUTO: NEGATIVE
NITRITE UR QL STRIP: NEGATIVE
PH UR STRIP.AUTO: 7.5 [PH] (ref 5–9)
PROT UR STRIP.AUTO-MCNC: NEGATIVE MG/DL
SP GR UR REFRACT.AUTO: 1.01 (ref 1–1.03)
UROBILINOGEN, URINE: 1 EU/DL (ref 0–1)
WBC #/AREA URNS HPF: NEGATIVE /[HPF]

## 2025-07-15 PROCEDURE — 93010 ELECTROCARDIOGRAM REPORT: CPT | Performed by: NUCLEAR MEDICINE

## 2025-07-15 PROCEDURE — 81003 URINALYSIS AUTO W/O SCOPE: CPT

## 2025-07-15 ASSESSMENT — PAIN - FUNCTIONAL ASSESSMENT
PAIN_FUNCTIONAL_ASSESSMENT: NONE - DENIES PAIN
PAIN_FUNCTIONAL_ASSESSMENT: NONE - DENIES PAIN
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
## 2025-07-15 NOTE — ED NOTES
Pt resting on cot w/ eyes closed upon entrance. Respirations even and unlabored. Light off for pt comfort. Blankets provided for pt comfort.

## 2025-07-18 ENCOUNTER — APPOINTMENT (OUTPATIENT)
Dept: CT IMAGING | Age: 75
DRG: 884 | End: 2025-07-18
Payer: COMMERCIAL

## 2025-07-18 ENCOUNTER — APPOINTMENT (OUTPATIENT)
Dept: GENERAL RADIOLOGY | Age: 75
DRG: 884 | End: 2025-07-18
Payer: COMMERCIAL

## 2025-07-18 ENCOUNTER — HOSPITAL ENCOUNTER (INPATIENT)
Age: 75
LOS: 1 days | Discharge: NURSING, REHAB OR LONG-TERM CARE | DRG: 884 | End: 2025-07-22
Attending: EMERGENCY MEDICINE | Admitting: FAMILY MEDICINE
Payer: COMMERCIAL

## 2025-07-18 DIAGNOSIS — U07.1 COVID: ICD-10-CM

## 2025-07-18 DIAGNOSIS — R56.9 SEIZURE-LIKE ACTIVITY (HCC): ICD-10-CM

## 2025-07-18 DIAGNOSIS — R41.82 ALTERED MENTAL STATUS, UNSPECIFIED ALTERED MENTAL STATUS TYPE: Primary | ICD-10-CM

## 2025-07-18 LAB
ALBUMIN SERPL BCG-MCNC: 3.8 G/DL (ref 3.4–4.9)
ALP SERPL-CCNC: 94 U/L (ref 40–129)
ALT SERPL W/O P-5'-P-CCNC: 23 U/L (ref 10–50)
AMPHETAMINES UR QL SCN: NEGATIVE
ANION GAP SERPL CALC-SCNC: 12 MEQ/L (ref 8–16)
AST SERPL-CCNC: 24 U/L (ref 10–50)
BARBITURATES UR QL SCN: NEGATIVE
BASOPHILS ABSOLUTE: 0.1 THOU/MM3 (ref 0–0.1)
BASOPHILS NFR BLD AUTO: 1.3 %
BENZODIAZ UR QL SCN: POSITIVE
BILIRUB SERPL-MCNC: 0.5 MG/DL (ref 0.3–1.2)
BILIRUB UR QL STRIP.AUTO: NEGATIVE
BUN SERPL-MCNC: 17 MG/DL (ref 8–23)
BUPRENORPHINE URINE: NEGATIVE
BZE UR QL SCN: NEGATIVE
CALCIUM SERPL-MCNC: 9.7 MG/DL (ref 8.8–10.2)
CANNABINOIDS UR QL SCN: NEGATIVE
CHARACTER UR: CLEAR
CHLORIDE SERPL-SCNC: 107 MEQ/L (ref 98–111)
CO2 SERPL-SCNC: 24 MEQ/L (ref 22–29)
COLOR, UA: YELLOW
CREAT SERPL-MCNC: 1 MG/DL (ref 0.7–1.2)
DEPRECATED RDW RBC AUTO: 42.5 FL (ref 35–45)
EOSINOPHIL NFR BLD AUTO: 1.8 %
EOSINOPHILS ABSOLUTE: 0.1 THOU/MM3 (ref 0–0.4)
ERYTHROCYTE [DISTWIDTH] IN BLOOD BY AUTOMATED COUNT: 12.3 % (ref 11.5–14.5)
FENTANYL: NEGATIVE
FLUAV RNA RESP QL NAA+PROBE: NOT DETECTED
FLUBV RNA RESP QL NAA+PROBE: NOT DETECTED
GFR SERPL CREATININE-BSD FRML MDRD: 78 ML/MIN/1.73M2
GLUCOSE SERPL-MCNC: 89 MG/DL (ref 74–109)
GLUCOSE UR QL STRIP.AUTO: NEGATIVE MG/DL
HCT VFR BLD AUTO: 44 % (ref 42–52)
HGB BLD-MCNC: 14.5 GM/DL (ref 14–18)
HGB UR QL STRIP.AUTO: NEGATIVE
IMM GRANULOCYTES # BLD AUTO: 0.04 THOU/MM3 (ref 0–0.07)
IMM GRANULOCYTES NFR BLD AUTO: 0.7 %
KETONES UR QL STRIP.AUTO: 15
LACTATE SERPL-SCNC: 2.9 MMOL/L (ref 0.5–2.2)
LACTIC ACID, SEPSIS: 0.9 MMOL/L (ref 0.5–1.9)
LYMPHOCYTES ABSOLUTE: 1 THOU/MM3 (ref 1–4.8)
LYMPHOCYTES NFR BLD AUTO: 16.2 %
MCH RBC QN AUTO: 31 PG (ref 26–33)
MCHC RBC AUTO-ENTMCNC: 33 GM/DL (ref 32.2–35.5)
MCV RBC AUTO: 94.2 FL (ref 80–94)
MONOCYTES ABSOLUTE: 0.8 THOU/MM3 (ref 0.4–1.3)
MONOCYTES NFR BLD AUTO: 12.9 %
NEUTROPHILS ABSOLUTE: 4.1 THOU/MM3 (ref 1.8–7.7)
NEUTROPHILS NFR BLD AUTO: 67.1 %
NITRITE UR QL STRIP: NEGATIVE
NRBC BLD AUTO-RTO: 0 /100 WBC
OPIATES UR QL SCN: NEGATIVE
OSMOLALITY SERPL CALC.SUM OF ELEC: 286 MOSMOL/KG (ref 275–300)
OXYCODONE: NEGATIVE
PCP UR QL SCN: NEGATIVE
PH UR STRIP.AUTO: 6.5 [PH] (ref 5–9)
PLATELET # BLD AUTO: 167 THOU/MM3 (ref 130–400)
PMV BLD AUTO: 10.2 FL (ref 9.4–12.4)
POTASSIUM SERPL-SCNC: 4.8 MEQ/L (ref 3.5–5.2)
PROT SERPL-MCNC: 6.2 G/DL (ref 6.4–8.3)
PROT UR STRIP.AUTO-MCNC: NEGATIVE MG/DL
RBC # BLD AUTO: 4.67 MILL/MM3 (ref 4.7–6.1)
SARS-COV-2 RNA RESP QL NAA+PROBE: DETECTED
SODIUM SERPL-SCNC: 143 MEQ/L (ref 135–145)
SP GR UR REFRACT.AUTO: 1.02 (ref 1–1.03)
UROBILINOGEN, URINE: 1 EU/DL (ref 0–1)
WBC # BLD AUTO: 6.1 THOU/MM3 (ref 4.8–10.8)
WBC #/AREA URNS HPF: NEGATIVE /[HPF]

## 2025-07-18 PROCEDURE — 2580000003 HC RX 258

## 2025-07-18 PROCEDURE — 71045 X-RAY EXAM CHEST 1 VIEW: CPT

## 2025-07-18 PROCEDURE — 72125 CT NECK SPINE W/O DYE: CPT

## 2025-07-18 PROCEDURE — 6360000002 HC RX W HCPCS

## 2025-07-18 PROCEDURE — 99285 EMERGENCY DEPT VISIT HI MDM: CPT

## 2025-07-18 PROCEDURE — 85025 COMPLETE CBC W/AUTO DIFF WBC: CPT

## 2025-07-18 PROCEDURE — 87636 SARSCOV2 & INF A&B AMP PRB: CPT

## 2025-07-18 PROCEDURE — 96374 THER/PROPH/DIAG INJ IV PUSH: CPT

## 2025-07-18 PROCEDURE — 96361 HYDRATE IV INFUSION ADD-ON: CPT

## 2025-07-18 PROCEDURE — 6360000002 HC RX W HCPCS: Performed by: EMERGENCY MEDICINE

## 2025-07-18 PROCEDURE — 2500000003 HC RX 250 WO HCPCS: Performed by: EMERGENCY MEDICINE

## 2025-07-18 PROCEDURE — 70450 CT HEAD/BRAIN W/O DYE: CPT

## 2025-07-18 PROCEDURE — 36415 COLL VENOUS BLD VENIPUNCTURE: CPT

## 2025-07-18 PROCEDURE — 96372 THER/PROPH/DIAG INJ SC/IM: CPT

## 2025-07-18 PROCEDURE — 81003 URINALYSIS AUTO W/O SCOPE: CPT

## 2025-07-18 PROCEDURE — 80053 COMPREHEN METABOLIC PANEL: CPT

## 2025-07-18 PROCEDURE — 80307 DRUG TEST PRSMV CHEM ANLYZR: CPT

## 2025-07-18 PROCEDURE — 83605 ASSAY OF LACTIC ACID: CPT

## 2025-07-18 RX ORDER — MIDAZOLAM HYDROCHLORIDE 5 MG/ML
INJECTION INTRAMUSCULAR; INTRAVENOUS
Status: COMPLETED
Start: 2025-07-18 | End: 2025-07-18

## 2025-07-18 RX ORDER — QUETIAPINE FUMARATE 25 MG/1
25 TABLET, FILM COATED ORAL ONCE
Status: DISCONTINUED | OUTPATIENT
Start: 2025-07-18 | End: 2025-07-18

## 2025-07-18 RX ORDER — MIDAZOLAM HYDROCHLORIDE 2 MG/2ML
2 INJECTION, SOLUTION INTRAMUSCULAR; INTRAVENOUS ONCE
Status: DISCONTINUED | OUTPATIENT
Start: 2025-07-18 | End: 2025-07-18

## 2025-07-18 RX ORDER — 0.9 % SODIUM CHLORIDE 0.9 %
1000 INTRAVENOUS SOLUTION INTRAVENOUS ONCE
Status: COMPLETED | OUTPATIENT
Start: 2025-07-18 | End: 2025-07-18

## 2025-07-18 RX ORDER — MIDAZOLAM HYDROCHLORIDE 5 MG/ML
10 INJECTION, SOLUTION INTRAMUSCULAR; INTRAVENOUS ONCE
Status: COMPLETED | OUTPATIENT
Start: 2025-07-18 | End: 2025-07-18

## 2025-07-18 RX ADMIN — MIDAZOLAM 10 MG: 5 INJECTION INTRAMUSCULAR; INTRAVENOUS at 19:31

## 2025-07-18 RX ADMIN — SODIUM CHLORIDE 1000 ML: 0.9 INJECTION, SOLUTION INTRAVENOUS at 20:45

## 2025-07-18 RX ADMIN — WATER 5 MG: 1 INJECTION INTRAMUSCULAR; INTRAVENOUS; SUBCUTANEOUS at 19:49

## 2025-07-18 RX ADMIN — MIDAZOLAM HYDROCHLORIDE 10 MG: 5 INJECTION, SOLUTION INTRAMUSCULAR; INTRAVENOUS at 19:31

## 2025-07-18 ASSESSMENT — PAIN - FUNCTIONAL ASSESSMENT: PAIN_FUNCTIONAL_ASSESSMENT: NONE - DENIES PAIN
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
## 2025-07-19 ENCOUNTER — APPOINTMENT (OUTPATIENT)
Dept: CT IMAGING | Age: 75
DRG: 884 | End: 2025-07-19
Payer: COMMERCIAL

## 2025-07-19 PROBLEM — R51.9 HEADACHE, UNSPECIFIED HEADACHE TYPE: Status: ACTIVE | Noted: 2025-07-19

## 2025-07-19 PROBLEM — S01.81XA LACERATION OF FOREHEAD: Status: ACTIVE | Noted: 2025-07-19

## 2025-07-19 PROBLEM — R41.82 AMS (ALTERED MENTAL STATUS): Status: ACTIVE | Noted: 2025-07-19

## 2025-07-19 PROBLEM — Z86.79 HISTORY OF HYPERTENSION: Status: ACTIVE | Noted: 2025-07-19

## 2025-07-19 PROBLEM — R56.9 SEIZURE-LIKE ACTIVITY (HCC): Status: ACTIVE | Noted: 2025-07-19

## 2025-07-19 PROBLEM — R79.89 ELEVATED LACTIC ACID LEVEL: Status: ACTIVE | Noted: 2025-07-19

## 2025-07-19 PROBLEM — U07.1 SARS-COV-2 POSITIVE: Status: ACTIVE | Noted: 2025-07-19

## 2025-07-19 LAB
GLUCOSE BLD STRIP.AUTO-MCNC: 120 MG/DL (ref 70–108)
GLUCOSE BLD STRIP.AUTO-MCNC: 60 MG/DL (ref 70–108)
GLUCOSE BLD STRIP.AUTO-MCNC: 66 MG/DL (ref 70–108)

## 2025-07-19 PROCEDURE — 82948 REAGENT STRIP/BLOOD GLUCOSE: CPT

## 2025-07-19 PROCEDURE — 96372 THER/PROPH/DIAG INJ SC/IM: CPT

## 2025-07-19 PROCEDURE — G0378 HOSPITAL OBSERVATION PER HR: HCPCS

## 2025-07-19 PROCEDURE — 70450 CT HEAD/BRAIN W/O DYE: CPT

## 2025-07-19 PROCEDURE — 2580000003 HC RX 258: Performed by: FAMILY MEDICINE

## 2025-07-19 PROCEDURE — 2500000003 HC RX 250 WO HCPCS

## 2025-07-19 PROCEDURE — 99223 1ST HOSP IP/OBS HIGH 75: CPT | Performed by: FAMILY MEDICINE

## 2025-07-19 PROCEDURE — 6360000002 HC RX W HCPCS

## 2025-07-19 PROCEDURE — 96361 HYDRATE IV INFUSION ADD-ON: CPT

## 2025-07-19 RX ORDER — DONEPEZIL HYDROCHLORIDE 10 MG/1
10 TABLET, FILM COATED ORAL NIGHTLY
Status: DISCONTINUED | OUTPATIENT
Start: 2025-07-19 | End: 2025-07-19

## 2025-07-19 RX ORDER — FUROSEMIDE 20 MG/1
20 TABLET ORAL
Status: DISCONTINUED | OUTPATIENT
Start: 2025-07-19 | End: 2025-07-22 | Stop reason: HOSPADM

## 2025-07-19 RX ORDER — LORAZEPAM 0.5 MG/1
0.5 TABLET ORAL EVERY 8 HOURS PRN
Status: DISCONTINUED | OUTPATIENT
Start: 2025-07-19 | End: 2025-07-19

## 2025-07-19 RX ORDER — ONDANSETRON 4 MG/1
4 TABLET, ORALLY DISINTEGRATING ORAL EVERY 8 HOURS PRN
Status: DISCONTINUED | OUTPATIENT
Start: 2025-07-19 | End: 2025-07-22 | Stop reason: HOSPADM

## 2025-07-19 RX ORDER — SODIUM CHLORIDE 9 MG/ML
INJECTION, SOLUTION INTRAVENOUS PRN
Status: DISCONTINUED | OUTPATIENT
Start: 2025-07-19 | End: 2025-07-22 | Stop reason: HOSPADM

## 2025-07-19 RX ORDER — MEMANTINE HYDROCHLORIDE 10 MG/1
10 TABLET ORAL 2 TIMES DAILY
Status: DISCONTINUED | OUTPATIENT
Start: 2025-07-19 | End: 2025-07-22 | Stop reason: HOSPADM

## 2025-07-19 RX ORDER — ACETAMINOPHEN 650 MG/1
650 SUPPOSITORY RECTAL EVERY 6 HOURS PRN
Status: DISCONTINUED | OUTPATIENT
Start: 2025-07-19 | End: 2025-07-22 | Stop reason: HOSPADM

## 2025-07-19 RX ORDER — DEXTROSE MONOHYDRATE AND SODIUM CHLORIDE 5; .45 G/100ML; G/100ML
INJECTION, SOLUTION INTRAVENOUS CONTINUOUS
Status: DISCONTINUED | OUTPATIENT
Start: 2025-07-19 | End: 2025-07-20

## 2025-07-19 RX ORDER — ONDANSETRON 2 MG/ML
4 INJECTION INTRAMUSCULAR; INTRAVENOUS EVERY 6 HOURS PRN
Status: DISCONTINUED | OUTPATIENT
Start: 2025-07-19 | End: 2025-07-22 | Stop reason: HOSPADM

## 2025-07-19 RX ORDER — TAMSULOSIN HYDROCHLORIDE 0.4 MG/1
0.4 CAPSULE ORAL DAILY
Status: DISCONTINUED | OUTPATIENT
Start: 2025-07-19 | End: 2025-07-19

## 2025-07-19 RX ORDER — SODIUM CHLORIDE 0.9 % (FLUSH) 0.9 %
5-40 SYRINGE (ML) INJECTION EVERY 12 HOURS SCHEDULED
Status: DISCONTINUED | OUTPATIENT
Start: 2025-07-19 | End: 2025-07-22 | Stop reason: HOSPADM

## 2025-07-19 RX ORDER — ESCITALOPRAM OXALATE 10 MG/1
15 TABLET ORAL DAILY
Status: DISCONTINUED | OUTPATIENT
Start: 2025-07-19 | End: 2025-07-22 | Stop reason: HOSPADM

## 2025-07-19 RX ORDER — OLANZAPINE 2.5 MG/1
2.5 TABLET, FILM COATED ORAL NIGHTLY
Status: DISCONTINUED | OUTPATIENT
Start: 2025-07-19 | End: 2025-07-22 | Stop reason: HOSPADM

## 2025-07-19 RX ORDER — GLUCAGON 1 MG/ML
1 KIT INJECTION PRN
Status: DISCONTINUED | OUTPATIENT
Start: 2025-07-19 | End: 2025-07-22 | Stop reason: HOSPADM

## 2025-07-19 RX ORDER — DEXTROSE MONOHYDRATE 100 MG/ML
INJECTION, SOLUTION INTRAVENOUS CONTINUOUS PRN
Status: DISCONTINUED | OUTPATIENT
Start: 2025-07-19 | End: 2025-07-22 | Stop reason: HOSPADM

## 2025-07-19 RX ORDER — POTASSIUM CHLORIDE 1500 MG/1
40 TABLET, EXTENDED RELEASE ORAL PRN
Status: DISCONTINUED | OUTPATIENT
Start: 2025-07-19 | End: 2025-07-22 | Stop reason: HOSPADM

## 2025-07-19 RX ORDER — SODIUM CHLORIDE 0.9 % (FLUSH) 0.9 %
5-40 SYRINGE (ML) INJECTION PRN
Status: DISCONTINUED | OUTPATIENT
Start: 2025-07-19 | End: 2025-07-22 | Stop reason: HOSPADM

## 2025-07-19 RX ORDER — POTASSIUM CHLORIDE 7.45 MG/ML
10 INJECTION INTRAVENOUS PRN
Status: DISCONTINUED | OUTPATIENT
Start: 2025-07-19 | End: 2025-07-22 | Stop reason: HOSPADM

## 2025-07-19 RX ORDER — MAGNESIUM SULFATE IN WATER 40 MG/ML
2000 INJECTION, SOLUTION INTRAVENOUS PRN
Status: DISCONTINUED | OUTPATIENT
Start: 2025-07-19 | End: 2025-07-22 | Stop reason: HOSPADM

## 2025-07-19 RX ORDER — ENOXAPARIN SODIUM 100 MG/ML
40 INJECTION SUBCUTANEOUS DAILY
Status: DISCONTINUED | OUTPATIENT
Start: 2025-07-19 | End: 2025-07-22 | Stop reason: HOSPADM

## 2025-07-19 RX ORDER — POLYETHYLENE GLYCOL 3350 17 G/17G
17 POWDER, FOR SOLUTION ORAL DAILY PRN
Status: DISCONTINUED | OUTPATIENT
Start: 2025-07-19 | End: 2025-07-22 | Stop reason: HOSPADM

## 2025-07-19 RX ORDER — ACETAMINOPHEN 325 MG/1
650 TABLET ORAL EVERY 6 HOURS PRN
Status: DISCONTINUED | OUTPATIENT
Start: 2025-07-19 | End: 2025-07-22 | Stop reason: HOSPADM

## 2025-07-19 RX ADMIN — DEXTROSE 125 ML: 10 SOLUTION INTRAVENOUS at 18:48

## 2025-07-19 RX ADMIN — ENOXAPARIN SODIUM 40 MG: 100 INJECTION SUBCUTANEOUS at 10:14

## 2025-07-19 RX ADMIN — SODIUM CHLORIDE, PRESERVATIVE FREE 10 ML: 5 INJECTION INTRAVENOUS at 10:14

## 2025-07-19 RX ADMIN — DEXTROSE AND SODIUM CHLORIDE: 5; .45 INJECTION, SOLUTION INTRAVENOUS at 16:33

## 2025-07-19 NOTE — PLAN OF CARE
Problem: Discharge Planning  Goal: Discharge to home or other facility with appropriate resources  7/19/2025 1328 by Clare Serrano RN  Outcome: Progressing  7/19/2025 0603 by Carl Ivy RN  Outcome: Progressing  Flowsheets (Taken 7/19/2025 0145)  Discharge to home or other facility with appropriate resources: Identify barriers to discharge with patient and caregiver     Problem: Pain  Goal: Verbalizes/displays adequate comfort level or baseline comfort level  7/19/2025 1328 by Clare Serrano RN  Outcome: Progressing  7/19/2025 0603 by Carl Ivy RN  Outcome: Progressing     Problem: Safety - Adult  Goal: Free from fall injury  7/19/2025 1328 by Clare Serrano RN  Outcome: Progressing  7/19/2025 0603 by Carl Ivy RN  Outcome: Progressing  Flowsheets (Taken 7/19/2025 0145)  Free From Fall Injury: Instruct family/caregiver on patient safety     Problem: Neurosensory - Adult  Goal: Achieves stable or improved neurological status  7/19/2025 1328 by Clare Serrano RN  Outcome: Progressing  7/19/2025 0603 by Carl Ivy RN  Outcome: Progressing  Flowsheets (Taken 7/19/2025 0603)  Achieves stable or improved neurological status: Assess for and report changes in neurological status     Problem: Respiratory - Adult  Goal: Achieves optimal ventilation and oxygenation  7/19/2025 1328 by Clare Serrano RN  Outcome: Progressing  7/19/2025 0603 by Carl Ivy RN  Outcome: Progressing  Flowsheets (Taken 7/19/2025 0603)  Achieves optimal ventilation and oxygenation:   Assess for changes in respiratory status   Assess for changes in mentation and behavior     Problem: Cardiovascular - Adult  Goal: Maintains optimal cardiac output and hemodynamic stability  7/19/2025 1328 by Clare Serrano RN  Outcome: Progressing  7/19/2025 0603 by Carl Ivy RN  Outcome: Progressing  Flowsheets (Taken 7/19/2025 0603)  Maintains optimal cardiac output and hemodynamic stability:   Monitor blood pressure and heart rate

## 2025-07-19 NOTE — ED NOTES
Pt continues to lay in bed and sleep with no s/s of distress noted. Respirations easy, even. Posey alarm in place and telesitter in room. Call light in reach.

## 2025-07-19 NOTE — ED NOTES
Pt continues to lay in bed and sleep. Respirations easy, even. Posey alarm in place and telesitter in room. Call light in reach.

## 2025-07-19 NOTE — CARE COORDINATION
07/19/25 1559   Readmission Assessment   Number of Days since last admission? 8-30 days   Previous Disposition SNF  ([facility])   Who is being Interviewed Unable to Complete  (pt confused)   What was the patient's/caregiver's perception as to why they think they needed to return back to the hospital? Other (Comment)  (became combative)   Did you visit your Primary Care Physician after you left the hospital, before you returned this time? Yes  (at facility)   Did you see a specialist, such as Cardiac, Pulmonary, Orthopedic Physician, etc. after you left the hospital? No   Who advised the patient to return to the hospital? Skilled Unit  ([facility])   Does the patient report anything that got in the way of taking their medications? No   In our efforts to provide the best possible care to you and others like you, can you think of anything that we could have done to help you after you left the hospital the first time, so that you might not have needed to return so soon? Other (Comment)  (pt was discharged to [facility], now from [facility])
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
## 2025-07-19 NOTE — ED NOTES
ED to inpatient nurses report      Chief Complaint:  Chief Complaint   Patient presents with    Aggressive Behavior     Present to ED from: SNF    MOA:     LOC: Pt is alert and disoriented x 4.  Mobility: Requires assistance * 2  Oxygen Baseline: Room Air    Current needs required: None     Code Status:   DNR-CC    What abnormal results were found and what did you give/do to treat them? N/A  Any procedures or intervention occur? N/A    Mental Status:  Level of Consciousness: Alert (0)    Psych Assessment:   Psychosocial  Psychosocial (WDL): Exceptions to WDL  Patient Behaviors: (S) Aggressive physically, others (rprd aggression to others; currently cooperative and pleasant)    Vitals:  Patient Vitals for the past 24 hrs:   BP Temp Temp src Pulse Resp SpO2 Weight   07/18/25 2320 (!) 94/54 -- -- 58 11 100 % --   07/18/25 2220 (!) 96/55 -- -- 54 10 100 % --   07/18/25 2140 (!) 114/55 -- -- 53 14 98 % --   07/18/25 2040 (!) 96/50 -- -- 59 15 98 % --   07/18/25 1934 124/65 -- -- (!) 104 18 97 % --   07/18/25 1743 136/74 97.9 °F (36.6 °C) Oral 100 18 98 % 72.6 kg (160 lb)        LDAs:   Peripheral IV 07/18/25 Left;Posterior Forearm (Active)   Site Assessment Clean, dry & intact 07/18/25 2320   Line Status Normal saline locked 07/18/25 2320   Phlebitis Assessment No symptoms 07/18/25 2320   Infiltration Assessment 0 07/18/25 2320   Dressing Status Clean, dry & intact 07/18/25 2320   Dressing Type Transparent 07/18/25 2320   Dressing Intervention New 07/18/25 1933       Ambulatory Status:  Presents to emergency department  because of falls (Syncope, seizure, or loss of consciousness): No, Age > 70: Yes, Altered Mental Status, Intoxication with alcohol or substance confusion (Disorientation, impaired judgment, poor safety awaremess, or inability to follow instructions): Yes, Impaired Mobility: Ambulates or transfers with assistive devices or assistance; Unable to ambulate or transer.: Yes, Nursing Judgement:

## 2025-07-19 NOTE — ED NOTES
Post-Fall Assessment  Date of Fall:   7/18/25  Time of Fall:   1925   Yes No N/A Comment   Was Patient on [place] Program? [] [] [x]    Was the Fall Witnessed? [x] [] []    Were Clothes a Factor? [] [x] []    Was Patient Wearing Corrective Footwear? [x] [] []    Other Environmental Factors Involved? [] [x] []      Description of Fall  Who found the patient: Erica REARDON  Where was the patient at the time of the fall:  ED Room 10  Brief description of fall: Patient was attempting to exit the bed when his arm became stuck in the railing of the bed. [place] alarm was sounding as staff entered the room, witnessed the patient fall onto his knees, striking the right side of his face on the supply cart. Patient was assisted back to bed. Patient was guarding right shoulder. No loss of consciousness noted.  Patient comments regarding fall:   N/A - History of dementia, not speaking but guarding the right shoulder  Medications potentially contributing to fall risk (such as Sedatives, Hypnotics, Antihypertensives, Narcotics, Psychotropics, Anticonvulsants):   N/A    Patient Assessment of Injury    (Please document Vital Signs in Doc Flowsheet)     Yes No   Patient hit his/her head [x] []   Patient is taking an anticoagulant [] [x]   CT of Head requested [x] []     Neurological Assessment Protocol:    If the patient has hit his/her head during this fall,   a Neurological Assessment must be completed every 2 hours for 12 hours;   every 3 hours for 24 hours;   then every 4 hours for 24 hours.  Document in Doc Flowsheets.    Neurological Assessment Protocol initiated  yes    If the patient did not hit his/her head during this fall, monitor vital signs every 8 hours.  Notify the physician within 24 hours and document.      Physician Notification  Please document under “Provider Notification” group within the Assessment (Complex Assessment) template of Doc Flowsheets.     Physician notified yes      House Supervisor/Clinical Manager

## 2025-07-19 NOTE — CARE COORDINATION
07/19/25 1553   Service Assessment   Patient Orientation Other (see comment)  (pt confused)   Cognition Dementia / Early Alzheimer's   History Provided By Medical Record   Primary Caregiver Other (Comment)  ([facility])   Accompanied By/Relationship n/a   Support Systems Children;Other (Comment)  ([facility])   Patient's Healthcare Decision Maker is: Named in Scanned ACP Document   PCP Verified by CM Yes   Last Visit to PCP Within last 3 months   Prior Functional Level Assistance with the following:;Bathing;Dressing;Toileting;Cooking;Housework;Shopping   Current Functional Level Assistance with the following:;Bathing;Dressing;Toileting;Cooking;Housework;Shopping   Can patient return to prior living arrangement Yes   Ability to make needs known: Poor   Family able to assist with home care needs: Other (comment)  ([facility])   Would you like for me to discuss the discharge plan with any other family members/significant others, and if so, who? Yes  (family)   Financial Resources Medicare;Medicaid   Community Resources ECF/Home Care  ([facility])   Discharge Planning   Type of Residence Skilled Nursing Facility  ([facility])   Living Arrangements Other (Comment)  ([facility])   Current Services Prior To Admission Skilled Nursing Facility;Durable Medical Equipment  ([facility])   Current DME Prior to Arrival [place]   Potential Assistance Needed Skilled Nursing Facility  ([facility])   DME Ordered? No   Potential Assistance Purchasing Medications No   Type of Home Care Services None   Patient expects to be discharged to: Skilled nursing facility  ([facility])   Services At/After Discharge   Mode of Transport at Discharge BLS   Confirm Follow Up Transport Other (see comment)  (facilty)   Condition of Participation: Discharge Planning   The Plan for Transition of Care is related to the following treatment goals: \"get him back to faciity\"     Patient
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
## 2025-07-19 NOTE — ED NOTES
This nurse heard pt's bed alarm sounding from nurses' station. Upon entering room, this nurse noted pt on his knees at the end of his bed with nurse Erica holding pt upright by his shoulders. Pt assisted back into bed by staff. Pt noted to have small lac to forehead above right eye and to right forearm. Pt clutching right shoulder and then chest. Pt began having tremors and was placed on his side. Provider at bedside assessing pt.

## 2025-07-19 NOTE — ED NOTES
[place] sitter and bed alarm alarming. Pt found climbing out of end of bed. R arm pinned between rails at the end of bed, pt kneeling. Pt grabbed in motion but hit head against corner of supply cart. Additional staff in room to assist placing pt back on cot. Trisha, RN, Doug, RN, Charli, RN and Dr. Bhardwaj at bedside. Upon placing pt back on cot pt began to grab R shoulder, became tense and stiff while pressing to R side of chest. Pt not responding to verbal stimuli but intermittently responds to sternal rub. Pt placed on left side. Oral suction provided and INT initiated per ALEXYS [place] Charge. Medications ordered. Pt repositioned on cot. Placed on cardiac monitor and in gown.

## 2025-07-19 NOTE — ED NOTES
Pt in bed sleeping at this time. Respirations easy, even. [place] alarm in place and call light in reach.

## 2025-07-19 NOTE — CONSULTS
Department of Psychiatry  Consult Service   Psychiatric Assessment      Thank you very much for allowing us to participate in the care of this patient.      Reason for Consult: geripsych placement for dementia with behavioral disturbance, medication recommendations    HISTORY OF PRESENT ILLNESS:          The patient is a 74 y.o. male with significant history of gout, BPH, tobacco abuse, dementia  who initially presented for aggressive behaviors at the skilled nursing facility that he has been staying in however admitted to the medical floor after he had fallen in the emergency department and admitted for possible seizure-like concerns.  At this time patient is only oriented to self.  Is pleasant but is unable to answer any questions at this time.  Appears like he is in behavioral control since this morning.  Reviewed medication records.  Patient was seen by Dr. Walsh in end of June for similar presentation and much of the history was gathered from that.    PSYCHIATRIC HISTORY: Per records      Outpatient psychiatric provider:  Medication management through assisted living facility where he resides   Suicide attempts: unknown   Inpatient psychiatric admissions: Unknown    Past psychiatric medications includes:   Unable to assess past psychiatric medications due to patient's current mental state  Lexapro, Ativan, Aricept, Namenda per EPIC  Adverse reactions from psychotropic medications:    Unknown. No known allergies per [facility]      Lifetime Psychiatric Review of Systems Per record  Unable to assess due to patient's current mental state. Family reported that he had been experiencing hallucinations since January     Prior to Admission medications    Medication Sig Start Date End Date Taking? Authorizing Provider   escitalopram (LEXAPRO) 5 MG tablet Take 3 tablets by mouth daily 6/18/25   Breanne Hernandez MD   memantine (NAMENDA) 10 MG tablet Take 1 tablet by mouth 2 times daily    Breanne Hernandez MD

## 2025-07-19 NOTE — ED NOTES
Pt sitting up on cot; eyes closed. Breathing easy and unlabored on RA. Door ajar. Lights on. Rails up x2. [place] sitter in place. Bed alarm on. Call light remains within reach.

## 2025-07-19 NOTE — PLAN OF CARE
Problem: Discharge Planning  Goal: Discharge to home or other facility with appropriate resources  Outcome: Progressing  Flowsheets (Taken 7/19/2025 0145)  Discharge to home or other facility with appropriate resources: Identify barriers to discharge with patient and caregiver     Problem: Pain  Goal: Verbalizes/displays adequate comfort level or baseline comfort level  Outcome: Progressing     Problem: Safety - Adult  Goal: Free from fall injury  Outcome: Progressing  Flowsheets (Taken 7/19/2025 0145)  Free From Fall Injury: Instruct family/caregiver on patient safety     Problem: Neurosensory - Adult  Goal: Achieves stable or improved neurological status  Outcome: Progressing  Flowsheets (Taken 7/19/2025 0603)  Achieves stable or improved neurological status: Assess for and report changes in neurological status     Problem: Respiratory - Adult  Goal: Achieves optimal ventilation and oxygenation  Outcome: Progressing  Flowsheets (Taken 7/19/2025 0603)  Achieves optimal ventilation and oxygenation:   Assess for changes in respiratory status   Assess for changes in mentation and behavior     Problem: Cardiovascular - Adult  Goal: Maintains optimal cardiac output and hemodynamic stability  Outcome: Progressing  Flowsheets (Taken 7/19/2025 0603)  Maintains optimal cardiac output and hemodynamic stability:   Monitor blood pressure and heart rate   Assess for signs of decreased cardiac output   Monitor urine output and notify Licensed Independent Practitioner for values outside of normal range  Goal: Absence of cardiac dysrhythmias or at baseline  Outcome: Progressing  Flowsheets (Taken 7/19/2025 0603)  Absence of cardiac dysrhythmias or at baseline:   Monitor cardiac rate and rhythm   Assess for signs of decreased cardiac output     Problem: Skin/Tissue Integrity - Adult  Goal: Skin integrity remains intact  Outcome: Progressing  Flowsheets (Taken 7/19/2025 0145)  Skin Integrity Remains Intact:   Monitor for areas of

## 2025-07-20 PROBLEM — E16.2 HYPOGLYCEMIA: Status: ACTIVE | Noted: 2025-07-20

## 2025-07-20 LAB
ANION GAP SERPL CALC-SCNC: 9 MEQ/L (ref 8–16)
BASOPHILS ABSOLUTE: 0.1 THOU/MM3 (ref 0–0.1)
BASOPHILS NFR BLD AUTO: 1.3 %
BUN SERPL-MCNC: 9 MG/DL (ref 8–23)
CALCIUM SERPL-MCNC: 8.9 MG/DL (ref 8.8–10.2)
CHLORIDE SERPL-SCNC: 106 MEQ/L (ref 98–111)
CO2 SERPL-SCNC: 27 MEQ/L (ref 22–29)
CREAT SERPL-MCNC: 0.9 MG/DL (ref 0.7–1.2)
DEPRECATED RDW RBC AUTO: 41.8 FL (ref 35–45)
EOSINOPHIL NFR BLD AUTO: 2.4 %
EOSINOPHILS ABSOLUTE: 0.1 THOU/MM3 (ref 0–0.4)
ERYTHROCYTE [DISTWIDTH] IN BLOOD BY AUTOMATED COUNT: 12.2 % (ref 11.5–14.5)
GFR SERPL CREATININE-BSD FRML MDRD: 88 ML/MIN/1.73M2
GLUCOSE BLD STRIP.AUTO-MCNC: 148 MG/DL (ref 70–108)
GLUCOSE BLD STRIP.AUTO-MCNC: 62 MG/DL (ref 70–108)
GLUCOSE BLD STRIP.AUTO-MCNC: 69 MG/DL (ref 70–108)
GLUCOSE BLD STRIP.AUTO-MCNC: 75 MG/DL (ref 70–108)
GLUCOSE BLD STRIP.AUTO-MCNC: 87 MG/DL (ref 70–108)
GLUCOSE SERPL-MCNC: 130 MG/DL (ref 74–109)
HCT VFR BLD AUTO: 41 % (ref 42–52)
HGB BLD-MCNC: 13.8 GM/DL (ref 14–18)
IMM GRANULOCYTES # BLD AUTO: 0.02 THOU/MM3 (ref 0–0.07)
IMM GRANULOCYTES NFR BLD AUTO: 0.4 %
LYMPHOCYTES ABSOLUTE: 1 THOU/MM3 (ref 1–4.8)
LYMPHOCYTES NFR BLD AUTO: 18.4 %
MCH RBC QN AUTO: 31.4 PG (ref 26–33)
MCHC RBC AUTO-ENTMCNC: 33.7 GM/DL (ref 32.2–35.5)
MCV RBC AUTO: 93.2 FL (ref 80–94)
MONOCYTES ABSOLUTE: 0.7 THOU/MM3 (ref 0.4–1.3)
MONOCYTES NFR BLD AUTO: 13.4 %
NEUTROPHILS ABSOLUTE: 3.5 THOU/MM3 (ref 1.8–7.7)
NEUTROPHILS NFR BLD AUTO: 64.1 %
NRBC BLD AUTO-RTO: 0 /100 WBC
PLATELET # BLD AUTO: 135 THOU/MM3 (ref 130–400)
PMV BLD AUTO: 10.3 FL (ref 9.4–12.4)
POTASSIUM SERPL-SCNC: 3.9 MEQ/L (ref 3.5–5.2)
RBC # BLD AUTO: 4.4 MILL/MM3 (ref 4.7–6.1)
SODIUM SERPL-SCNC: 142 MEQ/L (ref 135–145)
WBC # BLD AUTO: 5.4 THOU/MM3 (ref 4.8–10.8)

## 2025-07-20 PROCEDURE — 80048 BASIC METABOLIC PNL TOTAL CA: CPT

## 2025-07-20 PROCEDURE — G0378 HOSPITAL OBSERVATION PER HR: HCPCS

## 2025-07-20 PROCEDURE — 96361 HYDRATE IV INFUSION ADD-ON: CPT

## 2025-07-20 PROCEDURE — 6360000002 HC RX W HCPCS

## 2025-07-20 PROCEDURE — 99232 SBSQ HOSP IP/OBS MODERATE 35: CPT | Performed by: FAMILY MEDICINE

## 2025-07-20 PROCEDURE — 36415 COLL VENOUS BLD VENIPUNCTURE: CPT

## 2025-07-20 PROCEDURE — 96372 THER/PROPH/DIAG INJ SC/IM: CPT

## 2025-07-20 PROCEDURE — 82948 REAGENT STRIP/BLOOD GLUCOSE: CPT

## 2025-07-20 PROCEDURE — 85025 COMPLETE CBC W/AUTO DIFF WBC: CPT

## 2025-07-20 PROCEDURE — 2500000003 HC RX 250 WO HCPCS

## 2025-07-20 PROCEDURE — 2580000003 HC RX 258: Performed by: FAMILY MEDICINE

## 2025-07-20 PROCEDURE — 6370000000 HC RX 637 (ALT 250 FOR IP)

## 2025-07-20 PROCEDURE — 6370000000 HC RX 637 (ALT 250 FOR IP): Performed by: FAMILY MEDICINE

## 2025-07-20 RX ADMIN — SODIUM CHLORIDE, PRESERVATIVE FREE 10 ML: 5 INJECTION INTRAVENOUS at 20:42

## 2025-07-20 RX ADMIN — DEXTROSE AND SODIUM CHLORIDE: 5; .45 INJECTION, SOLUTION INTRAVENOUS at 06:09

## 2025-07-20 RX ADMIN — Medication 3 MG: at 20:42

## 2025-07-20 RX ADMIN — ESCITALOPRAM OXALATE 15 MG: 10 TABLET ORAL at 11:04

## 2025-07-20 RX ADMIN — DEXTROSE 125 ML: 10 SOLUTION INTRAVENOUS at 06:11

## 2025-07-20 RX ADMIN — MEMANTINE 10 MG: 10 TABLET ORAL at 11:04

## 2025-07-20 RX ADMIN — ENOXAPARIN SODIUM 40 MG: 100 INJECTION SUBCUTANEOUS at 11:04

## 2025-07-20 RX ADMIN — OLANZAPINE 2.5 MG: 2.5 TABLET, FILM COATED ORAL at 20:42

## 2025-07-20 RX ADMIN — MEMANTINE 10 MG: 10 TABLET ORAL at 20:42

## 2025-07-20 RX ADMIN — ACETAMINOPHEN 650 MG: 325 TABLET ORAL at 16:47

## 2025-07-20 NOTE — PLAN OF CARE
Problem: Discharge Planning  Goal: Discharge to home or other facility with appropriate resources  7/20/2025 0917 by Clare Serrano RN  Outcome: Progressing  7/19/2025 2221 by Carl Ivy RN  Outcome: Progressing  Flowsheets (Taken 7/19/2025 1930)  Discharge to home or other facility with appropriate resources: Identify barriers to discharge with patient and caregiver     Problem: Pain  Goal: Verbalizes/displays adequate comfort level or baseline comfort level  7/20/2025 0917 by Clare Serrano RN  Outcome: Progressing  7/19/2025 2221 by Carl Ivy RN  Outcome: Progressing  Flowsheets  Taken 7/19/2025 2221  Verbalizes/displays adequate comfort level or baseline comfort level:   Encourage patient to monitor pain and request assistance   Assess pain using appropriate pain scale  Taken 7/19/2025 1930  Verbalizes/displays adequate comfort level or baseline comfort level: Encourage patient to monitor pain and request assistance     Problem: Safety - Adult  Goal: Free from fall injury  7/20/2025 0917 by Clare Serrano RN  Outcome: Progressing  7/19/2025 2221 by Carl Ivy RN  Outcome: Progressing  Flowsheets (Taken 7/19/2025 0145)  Free From Fall Injury: Instruct family/caregiver on patient safety     Problem: Neurosensory - Adult  Goal: Achieves stable or improved neurological status  7/20/2025 0917 by Clare Serrano RN  Outcome: Progressing  7/19/2025 2221 by Carl Ivy RN  Outcome: Progressing  Flowsheets (Taken 7/19/2025 1930)  Achieves stable or improved neurological status:   Assess for and report changes in neurological status   Initiate measures to prevent increased intracranial pressure     Problem: Respiratory - Adult  Goal: Achieves optimal ventilation and oxygenation  7/20/2025 0917 by Clare Serrano RN  Outcome: Progressing  7/19/2025 2221 by Carl Ivy RN  Outcome: Progressing  Flowsheets (Taken 7/19/2025 1930)  Achieves optimal ventilation and oxygenation:   Assess for changes in

## 2025-07-20 NOTE — PLAN OF CARE
Problem: Discharge Planning  Goal: Discharge to home or other facility with appropriate resources  7/19/2025 2221 by Carl Ivy RN  Outcome: Progressing  Flowsheets (Taken 7/19/2025 1930)  Discharge to home or other facility with appropriate resources: Identify barriers to discharge with patient and caregiver  7/19/2025 1328 by Clare Serrano RN  Outcome: Progressing     Problem: Pain  Goal: Verbalizes/displays adequate comfort level or baseline comfort level  7/19/2025 2221 by Carl Ivy RN  Outcome: Progressing  Flowsheets (Taken 7/19/2025 2221)  Verbalizes/displays adequate comfort level or baseline comfort level:   Encourage patient to monitor pain and request assistance   Assess pain using appropriate pain scale  7/19/2025 1328 by Clare Serrano RN  Outcome: Progressing     Problem: Safety - Adult  Goal: Free from fall injury  7/19/2025 2221 by Carl Ivy RN  Outcome: Progressing  Flowsheets (Taken 7/19/2025 0145)  Free From Fall Injury: Instruct family/caregiver on patient safety  7/19/2025 1328 by Clare Serrano RN  Outcome: Progressing     Problem: Neurosensory - Adult  Goal: Achieves stable or improved neurological status  7/19/2025 2221 by Carl Ivy RN  Outcome: Progressing  Flowsheets (Taken 7/19/2025 1930)  Achieves stable or improved neurological status:   Assess for and report changes in neurological status   Initiate measures to prevent increased intracranial pressure  7/19/2025 1328 by Clare Serrano RN  Outcome: Progressing     Problem: Respiratory - Adult  Goal: Achieves optimal ventilation and oxygenation  7/19/2025 2221 by Carl Ivy RN  Outcome: Progressing  Flowsheets (Taken 7/19/2025 1930)  Achieves optimal ventilation and oxygenation:   Assess for changes in mentation and behavior   Assess for changes in respiratory status  7/19/2025 1328 by Clare Serrano RN  Outcome: Progressing     Problem: Cardiovascular - Adult  Goal: Maintains optimal cardiac output and

## 2025-07-21 PROBLEM — R45.1 AGITATION: Status: ACTIVE | Noted: 2025-07-21

## 2025-07-21 LAB
GLUCOSE BLD STRIP.AUTO-MCNC: 120 MG/DL (ref 70–108)
GLUCOSE BLD STRIP.AUTO-MCNC: 137 MG/DL (ref 70–108)
GLUCOSE BLD STRIP.AUTO-MCNC: 86 MG/DL (ref 70–108)

## 2025-07-21 PROCEDURE — 6370000000 HC RX 637 (ALT 250 FOR IP)

## 2025-07-21 PROCEDURE — 51798 US URINE CAPACITY MEASURE: CPT

## 2025-07-21 PROCEDURE — 6360000002 HC RX W HCPCS

## 2025-07-21 PROCEDURE — 1200000000 HC SEMI PRIVATE

## 2025-07-21 PROCEDURE — 96372 THER/PROPH/DIAG INJ SC/IM: CPT

## 2025-07-21 PROCEDURE — 2500000003 HC RX 250 WO HCPCS

## 2025-07-21 PROCEDURE — 99232 SBSQ HOSP IP/OBS MODERATE 35: CPT | Performed by: FAMILY MEDICINE

## 2025-07-21 PROCEDURE — 6370000000 HC RX 637 (ALT 250 FOR IP): Performed by: FAMILY MEDICINE

## 2025-07-21 PROCEDURE — 82948 REAGENT STRIP/BLOOD GLUCOSE: CPT

## 2025-07-21 RX ADMIN — MEMANTINE 10 MG: 10 TABLET ORAL at 19:42

## 2025-07-21 RX ADMIN — OLANZAPINE 2.5 MG: 2.5 TABLET, FILM COATED ORAL at 19:42

## 2025-07-21 RX ADMIN — SODIUM CHLORIDE, PRESERVATIVE FREE 10 ML: 5 INJECTION INTRAVENOUS at 19:42

## 2025-07-21 RX ADMIN — SODIUM CHLORIDE, PRESERVATIVE FREE 10 ML: 5 INJECTION INTRAVENOUS at 09:26

## 2025-07-21 RX ADMIN — ENOXAPARIN SODIUM 40 MG: 100 INJECTION SUBCUTANEOUS at 09:25

## 2025-07-21 ASSESSMENT — PAIN SCALES - WONG BAKER: WONGBAKER_NUMERICALRESPONSE: NO HURT

## 2025-07-21 NOTE — CARE COORDINATION
Case Management Assessment Initial Evaluation    Date/Time of Evaluation: 2025 3:19 PM  Assessment Completed by: Alexandra Boo RN    If patient is discharged prior to next notation, then this note serves as note for discharge by case management.    Patient Name: Jadyen Cadet                   YOB: 1950  Diagnosis: Seizure-like activity (HCC) [R56.9]  Altered mental status, unspecified altered mental status type [R41.82]  AMS (altered mental status) [R41.82]  COVID [U07.1]  Agitation [R45.1]                   Date / Time: 2025  5:36 PM  Location: [address] [place] [address]     Patient Admission Status: Inpatient   Readmission Risk Low 0-14, Mod 15-19), High > 20: Readmission Risk Score: 9.2    Current PCP: Taiwo Amin DO  Health Care Decision Makers:   Primary Decision Maker: Diego Cadet  Karen - [phone]    Secondary Decision Maker: Pelon Cadet  Karen  [phone]    Additional Case Management Notes: Presented to ED from SNF with c/o worse aggressive behaviors and hallucinations. Psych consulted and started on zyprexa. +COVID 19, no symptoms. On room air. Afebrile. Ox2. DNRCC. Plan for discharge to SNF tomorrow afternoon.     Procedures: none    Imagin/18 CT Head: No acute findings   CT Cervical spine: No fracture nonted   CXR: No acute process   CT head: No acute findings    Patient Goals/Plan/Treatment Preferences: Return to [facility].  on case. No precert required.

## 2025-07-21 NOTE — PLAN OF CARE
Problem: Discharge Planning  Goal: Discharge to home or other facility with appropriate resources  7/20/2025 2255 by Chanell Angelo RN  Outcome: Progressing  Flowsheets (Taken 7/19/2025 1930 by Carl Ivy RN)  Discharge to home or other facility with appropriate resources: Identify barriers to discharge with patient and caregiver  7/20/2025 0917 by Clare Serrano RN  Outcome: Progressing     Problem: Pain  Goal: Verbalizes/displays adequate comfort level or baseline comfort level  7/20/2025 2255 by Chanell Angelo RN  Outcome: Progressing  Flowsheets (Taken 7/19/2025 2221 by Carl Ivy RN)  Verbalizes/displays adequate comfort level or baseline comfort level:   Encourage patient to monitor pain and request assistance   Assess pain using appropriate pain scale  7/20/2025 0917 by Clare Serrano RN  Outcome: Progressing     Problem: Safety - Adult  Goal: Free from fall injury  7/20/2025 2255 by Chanell Angelo RN  Outcome: Progressing  Flowsheets (Taken 7/19/2025 0145 by Carl Ivy RN)  Free From Fall Injury: Instruct family/caregiver on patient safety  7/20/2025 0917 by Clare Serrano RN  Outcome: Progressing     Problem: Neurosensory - Adult  Goal: Achieves stable or improved neurological status  7/20/2025 2255 by Chanell Angelo RN  Outcome: Progressing  Flowsheets (Taken 7/19/2025 1930 by Carl Ivy RN)  Achieves stable or improved neurological status:   Assess for and report changes in neurological status   Initiate measures to prevent increased intracranial pressure  7/20/2025 0917 by Clare Serrano RN  Outcome: Progressing     Problem: Respiratory - Adult  Goal: Achieves optimal ventilation and oxygenation  7/20/2025 2255 by Chanell Angelo RN  Outcome: Progressing  7/20/2025 0917 by Clare Serrano RN  Outcome: Progressing     Problem: Cardiovascular - Adult  Goal: Maintains optimal cardiac output and hemodynamic stability  7/20/2025 2255 by Chanell Angelo RN  Outcome:

## 2025-07-21 NOTE — CARE COORDINATION
7/21/25, 10:54 AM EDT  Discharge Planning Evaluation  Social work consult received, patient from [facility].    [facility]  JOHNNY spoke with son Anshu and updated on discharge tomorrow around 1pm.  The patient's current payor source at the facility is medicaid.   Medicare skilled days available: yes if needs skilled  Medicare does the patient have a three midnight qualifying stay? no  Insurance precert:  no  Spoke with Gilberto at the facility. They will need to do a bed change today and will have a bed tomorrow around 1pm.   Patient bed hold: yes  Anticipated transport plan: ambulance  Patient's Healthcare Decision Maker: Named in Scanned ACP Document    Ambulance arranged for 1pm and [facility] will reach out to speak with RN to confirm time tomorrow am.

## 2025-07-21 NOTE — PLAN OF CARE
Problem: Discharge Planning  Goal: Discharge to home or other facility with appropriate resources  7/21/2025 1056 by Nadine Alcantar, MSW, LSW  Outcome: Progressing  Flowsheets (Taken 7/21/2025 1056)  Discharge to home or other facility with appropriate resources: Identify barriers to discharge with patient and caregiver  Note: Return to [facility], see  notes 7/21/25.

## 2025-07-22 VITALS
OXYGEN SATURATION: 97 % | BODY MASS INDEX: 23.62 KG/M2 | TEMPERATURE: 97.8 F | SYSTOLIC BLOOD PRESSURE: 146 MMHG | RESPIRATION RATE: 18 BRPM | WEIGHT: 155.87 LBS | DIASTOLIC BLOOD PRESSURE: 76 MMHG | HEART RATE: 100 BPM | HEIGHT: 68 IN

## 2025-07-22 LAB
GLUCOSE BLD STRIP.AUTO-MCNC: 74 MG/DL (ref 70–108)
GLUCOSE BLD STRIP.AUTO-MCNC: 79 MG/DL (ref 70–108)

## 2025-07-22 PROCEDURE — 82948 REAGENT STRIP/BLOOD GLUCOSE: CPT

## 2025-07-22 PROCEDURE — 6360000002 HC RX W HCPCS

## 2025-07-22 PROCEDURE — 6370000000 HC RX 637 (ALT 250 FOR IP)

## 2025-07-22 RX ORDER — OLANZAPINE 2.5 MG/1
2.5 TABLET, FILM COATED ORAL NIGHTLY
DISCHARGE
Start: 2025-07-22

## 2025-07-22 RX ADMIN — ENOXAPARIN SODIUM 40 MG: 100 INJECTION SUBCUTANEOUS at 08:48

## 2025-07-22 RX ADMIN — ESCITALOPRAM OXALATE 15 MG: 10 TABLET ORAL at 08:47

## 2025-07-22 RX ADMIN — ACETAMINOPHEN 650 MG: 325 TABLET ORAL at 06:26

## 2025-07-22 RX ADMIN — MEMANTINE 10 MG: 10 TABLET ORAL at 08:48

## 2025-07-22 ASSESSMENT — PAIN SCALES - WONG BAKER: WONGBAKER_NUMERICALRESPONSE: NO HURT

## 2025-07-22 ASSESSMENT — PAIN DESCRIPTION - LOCATION: LOCATION: BACK;HEAD

## 2025-07-22 NOTE — CARE COORDINATION
7/22/25, 11:01 AM EDT    Patient goals/plan/ treatment preferences discussed by  and .  Patient goals/plan/ treatment preferences reviewed with patient/ family.  Patient/ family verbalize understanding of discharge plan and are in agreement with goal/plan/treatment preferences.  Understanding was demonstrated using the teach back method.  AVS provided by RN at time of discharge, which includes all necessary medical information pertaining to the patients current course of illness, treatment, post-discharge goals of care, and treatment preferences.     Services At/After Discharge: Skilled Nursing Facility (SNF)    Patient returning to [facility] today and son was updated yesterday. Ambulance scheduled for 1pm. AVS sent via [employer]. Blue envelope on chart and ALEXYS [place] updated.

## 2025-07-22 NOTE — DISCHARGE SUMMARY
BUN 9 07/20/2025 06:47 AM    CREATININE 0.9 07/20/2025 06:47 AM    CALCIUM 8.9 07/20/2025 06:47 AM         Significant Diagnostic Studies    Radiology:   CT HEAD WO CONTRAST   Final Result      1. Stable CT scan of the brain, no interval change since previous study dated 18 July 2025.               **This report has been created using voice recognition software. It may contain   minor errors which are inherent in voice recognition technology.**      Electronically signed by Dr. Kamari Chaidez      CT HEAD WO CONTRAST   Final Result   No acute intracranial findings.      This document has been electronically signed by: Chester Thomas MD on    07/18/2025 08:32 PM      All CTs at this facility use dose modulation techniques and iterative    reconstructions, and/or weight-based dosing   when appropriate to reduce radiation to a low as reasonably achievable.      CT CERVICAL SPINE WO CONTRAST   Final Result   No acute findings.      This document has been electronically signed by: Chester Thomas MD on    07/18/2025 08:34 PM      All CTs at this facility use dose modulation techniques and iterative    reconstructions, and/or weight-based dosing   when appropriate to reduce radiation to a low as reasonably achievable.      XR CHEST PORTABLE   Final Result   No acute findings.      This document has been electronically signed by: Chester Thomas MD on    07/18/2025 08:16 PM             Consults:     IP CONSULT TO PSYCHIATRY  IP CONSULT TO SOCIAL WORK    Disposition:    [] Home       [] TCU       [] Rehab       [] Psych       [x] SNF       [] Long Term Care Facility       [] Other-    Condition at Discharge: Stable    Code Status:  Prior     Patient Instructions:    Activity: activity as tolerated  Diet: No diet orders on file      Follow-up visits:   Taiwo Amin DO  [facility] [address]  [phone]    Schedule an appointment as soon as possible for a visit  [place] Schedule appt for PCP check

## 2025-07-22 NOTE — PLAN OF CARE
Problem: Discharge Planning  Goal: Discharge to home or other facility with appropriate resources  7/21/2025 2128 by Chanell Angelo, RN  Outcome: Progressing  Flowsheets (Taken 7/21/2025 1056 by Nadine Alcantar MSW, KAROLINA)  Discharge to home or other facility with appropriate resources: Identify barriers to discharge with patient and caregiver  7/21/2025 1056 by Nadien Alcantar MSW, LSW  Outcome: Progressing  Flowsheets  Taken 7/21/2025 1056 by Nadine Alcantar MSW, KAROLINA  Discharge to home or other facility with appropriate resources: Identify barriers to discharge with patient and caregiver  Taken 7/21/2025 1000 by Ilana Richardson, RN  Discharge to home or other facility with appropriate resources: Identify barriers to discharge with patient and caregiver  Note: Return to [facility], see SW notes 7/21/25.     Problem: Pain  Goal: Verbalizes/displays adequate comfort level or baseline comfort level  Outcome: Progressing  Flowsheets (Taken 7/19/2025 2221 by Carl Ivy RN)  Verbalizes/displays adequate comfort level or baseline comfort level:   Encourage patient to monitor pain and request assistance   Assess pain using appropriate pain scale     Problem: Safety - Adult  Goal: Free from fall injury  Outcome: Progressing  Flowsheets (Taken 7/19/2025 0145 by Carl Ivy RN)  Free From Fall Injury: Instruct family/caregiver on patient safety     Problem: Neurosensory - Adult  Goal: Achieves stable or improved neurological status  Outcome: Progressing  Flowsheets (Taken 7/19/2025 1930 by Carl Ivy RN)  Achieves stable or improved neurological status:   Assess for and report changes in neurological status   Initiate measures to prevent increased intracranial pressure     Problem: Respiratory - Adult  Goal: Achieves optimal ventilation and oxygenation  Outcome: Progressing  Flowsheets (Taken 7/19/2025 1930 by Carl Ivy RN)  Achieves optimal ventilation and oxygenation:   Assess for changes

## 2025-07-22 NOTE — PLAN OF CARE
Problem: Discharge Planning  Goal: Discharge to home or other facility with appropriate resources  7/22/2025 0832 by Natalie Monroy RN  Outcome: Adequate for Discharge  7/21/2025 2128 by Chanell Angelo RN  Outcome: Progressing  Flowsheets (Taken 7/21/2025 1056 by Nadine Alcantar MSW, LSW)  Discharge to home or other facility with appropriate resources: Identify barriers to discharge with patient and caregiver     Problem: Pain  Goal: Verbalizes/displays adequate comfort level or baseline comfort level  7/22/2025 0832 by Natalie Monroy RN  Outcome: Adequate for Discharge  7/21/2025 2128 by Chanell Angelo RN  Outcome: Progressing  Flowsheets (Taken 7/19/2025 2221 by Carl Ivy, RN)  Verbalizes/displays adequate comfort level or baseline comfort level:   Encourage patient to monitor pain and request assistance   Assess pain using appropriate pain scale     Problem: Safety - Adult  Goal: Free from fall injury  7/22/2025 0832 by Natalie Monroy RN  Outcome: Adequate for Discharge  7/21/2025 2128 by Chanell Angelo RN  Outcome: Progressing  Flowsheets (Taken 7/19/2025 0145 by Carl Ivy, RN)  Free From Fall Injury: Instruct family/caregiver on patient safety     Problem: Neurosensory - Adult  Goal: Achieves stable or improved neurological status  7/22/2025 0832 by Natalie Monroy RN  Outcome: Adequate for Discharge  7/21/2025 2128 by Chanell Angelo RN  Outcome: Progressing  Flowsheets (Taken 7/19/2025 1930 by Carl Ivy, RN)  Achieves stable or improved neurological status:   Assess for and report changes in neurological status   Initiate measures to prevent increased intracranial pressure     Problem: Respiratory - Adult  Goal: Achieves optimal ventilation and oxygenation  7/22/2025 0832 by Natalie Monroy RN  Outcome: Adequate for Discharge  7/21/2025 2128 by Chanell Angelo RN  Outcome: Progressing  Flowsheets (Taken 7/19/2025 1930 by Carl Ivy, RN)  Achieves optimal ventilation

## 2025-07-22 NOTE — PROGRESS NOTES
2300-- Patient refusing to do bedside swallow, says he was told he is not allowed to eat/drink. Writer educated pt it is only d/t how sleepy he was. Patient still refusing.    0018- Writer in room, patient awake, alert to self and city. Writer offered water again at this time, pt still refusing. Talking about having to go to court in the AM and face charges. Says \"I am going to be the next president of the universe but they haven't been able to ask me because [place] been locked up all over the country.  Writer reoriented patient. Writer left patient room at 0041.    0102-- Telesitter going off, writer in room at 0103, patient folder with admission paperwork wrapped inside an isolation gown, writer able to get the folder and place in appropriate place. Patient states he needs it back because are going to destroy it.  
Pt resting laying down on couch with bed pulled next to it. Breathing easy and unlabored on RA. Door open, lights dimmed, virtual sitter in place. Call light within reach.   
This RN attempted to call report to [facility] four times and unable to get through to nursing station, [facility] for DON with callback number to call this RN for report on pt transferring back to [facility].  
Unable to reach [facility]. Attempted several times.  [facility] reached and stated at [facility] now. Attempted to call [place], sent to .  Clare Serrano RN  
Updated pt's son, Anshu Serrano, RN  
Laceration noted to right side of forehead     Mouth/Throat:      Mouth: Mucous membranes are moist.      Pharynx: Oropharynx is clear.   Eyes:      Conjunctiva/sclera: Conjunctivae normal.      Pupils: Pupils are equal, round, and reactive to light.   Cardiovascular:      Rate and Rhythm: Normal rate and regular rhythm.      Pulses: Normal pulses.      Heart sounds: Normal heart sounds.   Pulmonary:      Effort: Pulmonary effort is normal.      Breath sounds: Normal breath sounds.   Abdominal:      General: Bowel sounds are normal. There is no distension.      Tenderness: There is no abdominal tenderness.   Musculoskeletal:      Right lower leg: Edema present.      Left lower leg: Edema present.   Skin:     General: Skin is warm and dry.      Capillary Refill: Capillary refill takes 2 to 3 seconds.   Neurological:      Comments: Oriented only to name    Psychiatric:         Behavior: Behavior is cooperative.        All labs reviewed and interpreted by me:  Labs:   Recent Labs     07/18/25 1854 07/20/25  0647   WBC 6.1 5.4   HGB 14.5 13.8*   HCT 44.0 41.0*    135     Recent Labs     07/18/25 1854 07/20/25  0647    142   K 4.8 3.9    106   CO2 24 27   BUN 17 9   CREATININE 1.0 0.9   CALCIUM 9.7 8.9     Recent Labs     07/18/25  1854   AST 24   ALT 23   BILITOT 0.5   ALKPHOS 94     No results for input(s): \"INR\" in the last 72 hours.  No results for input(s): \"CKTOTAL\", \"TROPONINT\" in the last 72 hours.    Urinalysis:      Lab Results   Component Value Date/Time    NITRU NEGATIVE 07/18/2025 07:40 PM    WBCUA 0-2 08/23/2017 09:20 PM    BACTERIA NONE 08/23/2017 09:20 PM    RBCUA 0-2 08/23/2017 09:20 PM    BLOODU NEGATIVE 07/18/2025 07:40 PM    GLUCOSEU NEGATIVE 07/18/2025 07:40 PM       All radiology images and reports reviewed and interpreted by me:  Radiology:  CT HEAD WO CONTRAST   Final Result      1. Stable CT scan of the brain, no interval change since previous study dated 18 July 2025.    
Temp 97.8 °F (36.6 °C) (Oral)   Resp 16   Ht 1.727 m (5' 8\")   Wt 65.1 kg (143 lb 8.3 oz)   SpO2 94%   BMI 21.82 kg/m²     Physical Exam  Constitutional:       General: He is not in acute distress.  HENT:      Head: Normocephalic.      Comments: Laceration noted to right side of forehead     Mouth/Throat:      Mouth: Mucous membranes are moist.      Pharynx: Oropharynx is clear.   Eyes:      Conjunctiva/sclera: Conjunctivae normal.      Pupils: Pupils are equal, round, and reactive to light.   Cardiovascular:      Rate and Rhythm: Normal rate and regular rhythm.      Pulses: Normal pulses.      Heart sounds: Normal heart sounds.   Pulmonary:      Effort: Pulmonary effort is normal.      Breath sounds: Normal breath sounds.   Abdominal:      General: Bowel sounds are normal. There is no distension.      Tenderness: There is no abdominal tenderness.   Musculoskeletal:      Right lower leg: No edema.      Left lower leg: No edema.   Skin:     General: Skin is warm and dry.      Capillary Refill: Capillary refill takes less than 2 seconds.   Neurological:      Comments: Oriented only to name    Psychiatric:         Behavior: Behavior is cooperative.        All labs reviewed and interpreted by me:  Labs:   Recent Labs     07/18/25  1854 07/20/25  0647   WBC 6.1 5.4   HGB 14.5 13.8*   HCT 44.0 41.0*    135     Recent Labs     07/18/25  1854 07/20/25  0647    142   K 4.8 3.9    106   CO2 24 27   BUN 17 9   CREATININE 1.0 0.9   CALCIUM 9.7 8.9     Recent Labs     07/18/25  1854   AST 24   ALT 23   BILITOT 0.5   ALKPHOS 94     No results for input(s): \"INR\" in the last 72 hours.  No results for input(s): \"CKTOTAL\", \"TROPONINT\" in the last 72 hours.    Urinalysis:      Lab Results   Component Value Date/Time    NITRU NEGATIVE 07/18/2025 07:40 PM    WBCUA 0-2 08/23/2017 09:20 PM    BACTERIA NONE 08/23/2017 09:20 PM    RBCUA 0-2 08/23/2017 09:20 PM    BLOODU NEGATIVE 07/18/2025 07:40 PM    GLUCOSEU

## 2025-07-26 ENCOUNTER — HOSPITAL ENCOUNTER (EMERGENCY)
Age: 75
Discharge: HOME | End: 2025-07-26
Payer: COMMERCIAL

## 2025-07-26 VITALS
SYSTOLIC BLOOD PRESSURE: 126 MMHG | OXYGEN SATURATION: 100 % | RESPIRATION RATE: 15 BRPM | DIASTOLIC BLOOD PRESSURE: 61 MMHG | HEIGHT: 68 IN | BODY MASS INDEX: 23.49 KG/M2 | WEIGHT: 155 LBS | TEMPERATURE: 98.2 F | HEART RATE: 50 BPM

## 2025-07-26 DIAGNOSIS — F91.9 BEHAVIOR DISTURBANCE: Primary | ICD-10-CM

## 2025-07-26 LAB
ALBUMIN SERPL BCG-MCNC: 3.9 G/DL (ref 3.4–4.9)
ALP SERPL-CCNC: 89 U/L (ref 40–129)
ALT SERPL W/O P-5'-P-CCNC: 20 U/L (ref 10–50)
ANION GAP SERPL CALC-SCNC: 12 MEQ/L (ref 8–16)
APAP SERPL-MCNC: < 5 UG/ML (ref 10–30)
AST SERPL-CCNC: 32 U/L (ref 10–50)
BASOPHILS ABSOLUTE: 0.1 THOU/MM3 (ref 0–0.1)
BASOPHILS NFR BLD AUTO: 1 %
BILIRUB CONJ SERPL-MCNC: 0.3 MG/DL (ref 0–0.2)
BILIRUB SERPL-MCNC: 0.7 MG/DL (ref 0.3–1.2)
BILIRUB UR QL STRIP.AUTO: NEGATIVE
BUN SERPL-MCNC: 28 MG/DL (ref 8–23)
CALCIUM SERPL-MCNC: 9.5 MG/DL (ref 8.8–10.2)
CHARACTER UR: CLEAR
CHLORIDE SERPL-SCNC: 108 MEQ/L (ref 98–111)
CO2 SERPL-SCNC: 27 MEQ/L (ref 22–29)
COLOR, UA: YELLOW
CREAT SERPL-MCNC: 1.2 MG/DL (ref 0.7–1.2)
DEPRECATED RDW RBC AUTO: 43.7 FL (ref 35–45)
EKG ATRIAL RATE: 50 BPM
EKG P AXIS: 76 DEGREES
EKG P-R INTERVAL: 184 MS
EKG Q-T INTERVAL: 478 MS
EKG QRS DURATION: 100 MS
EKG QTC CALCULATION (BAZETT): 435 MS
EKG R AXIS: -29 DEGREES
EKG T AXIS: 52 DEGREES
EKG VENTRICULAR RATE: 50 BPM
EOSINOPHIL NFR BLD AUTO: 1.7 %
EOSINOPHILS ABSOLUTE: 0.1 THOU/MM3 (ref 0–0.4)
ERYTHROCYTE [DISTWIDTH] IN BLOOD BY AUTOMATED COUNT: 12.6 % (ref 11.5–14.5)
GFR SERPL CREATININE-BSD FRML MDRD: 62 ML/MIN/1.73M2
GLUCOSE SERPL-MCNC: 91 MG/DL (ref 74–109)
GLUCOSE UR QL STRIP.AUTO: NEGATIVE MG/DL
HCT VFR BLD AUTO: 43 % (ref 42–52)
HGB BLD-MCNC: 13.9 GM/DL (ref 14–18)
HGB UR QL STRIP.AUTO: NEGATIVE
IMM GRANULOCYTES # BLD AUTO: 0.04 THOU/MM3 (ref 0–0.07)
IMM GRANULOCYTES NFR BLD AUTO: 0.8 %
KETONES UR QL STRIP.AUTO: ABNORMAL
LIPASE SERPL-CCNC: 27 U/L (ref 13–60)
LYMPHOCYTES ABSOLUTE: 1.3 THOU/MM3 (ref 1–4.8)
LYMPHOCYTES NFR BLD AUTO: 25.4 %
MCH RBC QN AUTO: 30.7 PG (ref 26–33)
MCHC RBC AUTO-ENTMCNC: 32.3 GM/DL (ref 32.2–35.5)
MCV RBC AUTO: 94.9 FL (ref 80–94)
MONOCYTES ABSOLUTE: 0.8 THOU/MM3 (ref 0.4–1.3)
MONOCYTES NFR BLD AUTO: 14.7 %
NEUTROPHILS ABSOLUTE: 2.9 THOU/MM3 (ref 1.8–7.7)
NEUTROPHILS NFR BLD AUTO: 56.4 %
NITRITE UR QL STRIP: NEGATIVE
NRBC BLD AUTO-RTO: 0 /100 WBC
OSMOLALITY SERPL CALC.SUM OF ELEC: 297.5 MOSMOL/KG (ref 275–300)
PH UR STRIP.AUTO: 5.5 [PH] (ref 5–9)
PLATELET # BLD AUTO: 161 THOU/MM3 (ref 130–400)
PMV BLD AUTO: 10.3 FL (ref 9.4–12.4)
POTASSIUM SERPL-SCNC: 3.9 MEQ/L (ref 3.5–5.2)
PROT SERPL-MCNC: 6.3 G/DL (ref 6.4–8.3)
PROT UR STRIP.AUTO-MCNC: NEGATIVE MG/DL
RBC # BLD AUTO: 4.53 MILL/MM3 (ref 4.7–6.1)
SALICYLATES SERPL-MCNC: < 0.5 MG/DL (ref 2–10)
SODIUM SERPL-SCNC: 147 MEQ/L (ref 135–145)
SP GR UR REFRACT.AUTO: 1.02 (ref 1–1.03)
UROBILINOGEN, URINE: 1 EU/DL (ref 0–1)
WBC # BLD AUTO: 5.2 THOU/MM3 (ref 4.8–10.8)
WBC #/AREA URNS HPF: NEGATIVE /[HPF]

## 2025-07-26 PROCEDURE — 82248 BILIRUBIN DIRECT: CPT

## 2025-07-26 PROCEDURE — 99284 EMERGENCY DEPT VISIT MOD MDM: CPT

## 2025-07-26 PROCEDURE — 81003 URINALYSIS AUTO W/O SCOPE: CPT

## 2025-07-26 PROCEDURE — 80053 COMPREHEN METABOLIC PANEL: CPT

## 2025-07-26 PROCEDURE — 36415 COLL VENOUS BLD VENIPUNCTURE: CPT

## 2025-07-26 PROCEDURE — 80143 DRUG ASSAY ACETAMINOPHEN: CPT

## 2025-07-26 PROCEDURE — 83690 ASSAY OF LIPASE: CPT

## 2025-07-26 PROCEDURE — 93010 ELECTROCARDIOGRAM REPORT: CPT | Performed by: INTERNAL MEDICINE

## 2025-07-26 PROCEDURE — 85025 COMPLETE CBC W/AUTO DIFF WBC: CPT

## 2025-07-26 PROCEDURE — 80179 DRUG ASSAY SALICYLATE: CPT

## 2025-07-26 PROCEDURE — 93005 ELECTROCARDIOGRAM TRACING: CPT | Performed by: STUDENT IN AN ORGANIZED HEALTH CARE EDUCATION/TRAINING PROGRAM

## 2025-07-26 ASSESSMENT — PATIENT HEALTH QUESTIONNAIRE - PHQ9
2. FEELING DOWN, DEPRESSED OR HOPELESS: NOT AT ALL
1. LITTLE INTEREST OR PLEASURE IN DOING THINGS: NOT AT ALL
SUM OF ALL RESPONSES TO PHQ QUESTIONS 1-9: 0

## 2025-07-26 ASSESSMENT — SLEEP AND FATIGUE QUESTIONNAIRES
DO YOU USE A SLEEP AID: COMMENT
DO YOU HAVE DIFFICULTY SLEEPING: COMMENT

## 2025-07-26 ASSESSMENT — LIFESTYLE VARIABLES
HOW MANY STANDARD DRINKS CONTAINING ALCOHOL DO YOU HAVE ON A TYPICAL DAY: PATIENT DOES NOT DRINK
HOW OFTEN DO YOU HAVE A DRINK CONTAINING ALCOHOL: NEVER

## 2025-07-26 ASSESSMENT — PAIN - FUNCTIONAL ASSESSMENT
PAIN_FUNCTIONAL_ASSESSMENT: NONE - DENIES PAIN
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
## 2025-07-26 NOTE — PROGRESS NOTES
Per handover from Clinician gem Murray to return to [facility], [facility] 1430.

## 2025-07-26 NOTE — ED PROVIDER NOTES
Transfer of care from Kayleigh Kelley PA-C to Camryn Su PA-C on 7/26/25 at 0600.   Pt c/c of behavioral disturbance and agitation. Pt has been cooperative during the ED stay. BAC assessed the pt and determined he is unfit for psychiatric admit. Pt was cleared by psychiatry and awaiting [facility] transport 1430 today.     MDM  /  ED Course as of 07/27/25 1000   Sat Jul 26, 2025   0603 No needs were expressed at this time. Vitals stable at time. RN states the pt has been calm and cooperative.  [LK]      ED Course User Index  [LK] Camryn Su PA-C           FINAL IMPRESSION      1. Behavior disturbance          DISPOSITION/PLAN   DISPOSITION Decision To Discharge 07/26/2025 06:00:49 AM   DISPOSITION CONDITION Stable           OUTPATIENT FOLLOW UP THE PATIENT:  Taiwo Amin,   [facility] [address]  [phone]      Please consult primary care provider for as needed agitation medication.      QAMAR Gr Lanz, PA-C  07/27/25 1000

## 2025-07-26 NOTE — ED PROVIDER NOTES
[facility] EMERGENCY DEPARTMENT      EMERGENCY MEDICINE     Pt Name: Jayden Cadet  MRN: [id number]  Birthdate 1950  Date of evaluation: 7/26/2025  Provider: Kayleigh Kelley PA-C    CHIEF COMPLAINT       Chief Complaint   Patient presents with    Mental Health Problem     HISTORY OF PRESENT ILLNESS   Jayden Cadet is a pleasant 74 y.o. male who presents to the emergency department from from nursing home, brought in by EMS for evaluation of mental health problem.  According to [facility] care patient was throwing things including his Wii and smashing hand  and yelling at people patient has been a resident at [facility] for 10 days and has a history of dementia patient has been calm and alert on arrival.  The nurses at the nursing [facility] did try to reach out to psychology for as needed medications for his outburst/agitation however they told him just to take him to the emergency department as they do not want to give him any as needed medications.  Therefore no attempts were made to try to calm the patient via medications or de-escalation techniques.  BAC level B paged.        PASTMEDICAL HISTORY     Past Medical History:   Diagnosis Date    Dementia (HCC)     Gout     Hypertension     Kidney stone     Pneumothorax on right        Patient Active Problem List   Diagnosis Code    Kidney stones N20.0    Bilateral leg edema R60.0    History of renal stone Z87.442    Benign prostatic hyperplasia without lower urinary tract symptoms N40.0    Microscopic hematuria R31.29    Constipation K59.00    Rectal bleeding K62.5    Diverticulosis K57.90    Other forms of angina pectoris I20.89    Fall at home, initial encounter W19.XXXA, Y92.009    Falls R29.6    Hallucinations R44.3    Aggressive behavior due to dementia (HCC) F03.918    Swelling of both lower extremities M79.89    History of gout Z87.39    Primary hypertension I10    History of tobacco abuse Z87.891    Severe malnutrition

## 2025-07-26 NOTE — ED NOTES
Patient remains in sitting position on bed per request with easy and unlabored respirations. Denies needs.

## 2025-07-26 NOTE — ED NOTES
Patient resting in bed. Respirations easy and unlabored. No distress noted. Call light within reach.  Urine sent.

## 2025-07-26 NOTE — ED NOTES
Pt requested to ambulate outside of room. This RN ambulated with pt around the unit and then escorted back to room.

## 2025-07-26 NOTE — ED TRIAGE NOTES
Pt presents to ED from [facility]. EMS states that staff report that pt was throwing things at staff members and yelling at them. EMS states that pt has only been a resident for 10 days. Pt has hx of dementia. Pt alert to self. Pt cooperative upon arrival.

## 2025-07-26 NOTE — ED NOTES
Pt resting on cot w/ eyes open upon entrance. Respirations even and unlabored. Pt denies pain at this time.

## 2025-08-11 ENCOUNTER — APPOINTMENT (OUTPATIENT)
Dept: GENERAL RADIOLOGY | Age: 75
End: 2025-08-11
Payer: COMMERCIAL

## 2025-08-11 ENCOUNTER — HOSPITAL ENCOUNTER (INPATIENT)
Age: 75
LOS: 2 days | Discharge: NURSING, REHAB OR LONG-TERM CARE | End: 2025-08-14
Attending: EMERGENCY MEDICINE | Admitting: SURGERY
Payer: COMMERCIAL

## 2025-08-11 ENCOUNTER — APPOINTMENT (OUTPATIENT)
Dept: CT IMAGING | Age: 75
End: 2025-08-11
Payer: COMMERCIAL
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
## 2025-08-11 DIAGNOSIS — S22.42XA: Primary | ICD-10-CM

## 2025-08-11 DIAGNOSIS — F03.C18 SEVERE DEMENTIA WITH OTHER BEHAVIORAL DISTURBANCE, UNSPECIFIED DEMENTIA TYPE (HCC): ICD-10-CM

## 2025-08-11 LAB
ALBUMIN SERPL BCG-MCNC: 3.6 G/DL (ref 3.4–4.9)
ALP SERPL-CCNC: 154 U/L (ref 40–129)
ALT SERPL W/O P-5'-P-CCNC: 42 U/L (ref 10–50)
ANION GAP SERPL CALC-SCNC: 11 MEQ/L (ref 8–16)
APTT PPP: 34.6 SECONDS (ref 22–38)
AST SERPL-CCNC: 33 U/L (ref 10–50)
BASOPHILS ABSOLUTE: 0.1 THOU/MM3 (ref 0–0.1)
BASOPHILS NFR BLD AUTO: 1.5 %
BILIRUB CONJ SERPL-MCNC: 0.2 MG/DL (ref 0–0.2)
BILIRUB SERPL-MCNC: 0.4 MG/DL (ref 0.3–1.2)
BUN SERPL-MCNC: 12 MG/DL (ref 8–23)
CALCIUM SERPL-MCNC: 9 MG/DL (ref 8.5–10.5)
CHLORIDE SERPL-SCNC: 103 MEQ/L (ref 98–111)
CK SERPL-CCNC: 52 U/L (ref 39–308)
CO2 SERPL-SCNC: 26 MEQ/L (ref 22–29)
CREAT SERPL-MCNC: 1.1 MG/DL (ref 0.7–1.2)
DEPRECATED RDW RBC AUTO: 42.3 FL (ref 35–45)
EOSINOPHIL NFR BLD AUTO: 2.9 %
EOSINOPHILS ABSOLUTE: 0.2 THOU/MM3 (ref 0–0.4)
ERYTHROCYTE [DISTWIDTH] IN BLOOD BY AUTOMATED COUNT: 12.5 % (ref 11.5–14.5)
GFR SERPL CREATININE-BSD FRML MDRD: 69 ML/MIN/1.73M2
GLUCOSE SERPL-MCNC: 111 MG/DL (ref 74–109)
HCT VFR BLD AUTO: 40 % (ref 42–52)
HGB BLD-MCNC: 13.4 GM/DL (ref 14–18)
IMM GRANULOCYTES # BLD AUTO: 0.08 THOU/MM3 (ref 0–0.07)
IMM GRANULOCYTES NFR BLD AUTO: 1.5 %
INR PPP: 0.97 (ref 0.85–1.13)
LACTATE SERPL-SCNC: 2.6 MMOL/L (ref 0.5–2.2)
LYMPHOCYTES ABSOLUTE: 1.4 THOU/MM3 (ref 1–4.8)
LYMPHOCYTES NFR BLD AUTO: 26 %
MCH RBC QN AUTO: 30.9 PG (ref 26–33)
MCHC RBC AUTO-ENTMCNC: 33.5 GM/DL (ref 32.2–35.5)
MCV RBC AUTO: 92.2 FL (ref 80–94)
MONOCYTES ABSOLUTE: 0.6 THOU/MM3 (ref 0.4–1.3)
MONOCYTES NFR BLD AUTO: 11.2 %
NEUTROPHILS ABSOLUTE: 3.1 THOU/MM3 (ref 1.8–7.7)
NEUTROPHILS NFR BLD AUTO: 56.9 %
NRBC BLD AUTO-RTO: 0 /100 WBC
NT-PROBNP SERPL IA-MCNC: 191 PG/ML (ref 0–449)
OSMOLALITY SERPL CALC.SUM OF ELEC: 279.9 MOSMOL/KG (ref 275–300)
PLATELET # BLD AUTO: 186 THOU/MM3 (ref 130–400)
PMV BLD AUTO: 9.9 FL (ref 9.4–12.4)
POTASSIUM SERPL-SCNC: 3.9 MEQ/L (ref 3.5–5.2)
PROLACTIN SERPL-MCNC: 14.1 NG/ML
PROT SERPL-MCNC: 6.1 G/DL (ref 6.4–8.3)
PROTHROMBIN TIME: 11.3 SECONDS (ref 10–13.5)
RBC # BLD AUTO: 4.34 MILL/MM3 (ref 4.7–6.1)
SODIUM SERPL-SCNC: 140 MEQ/L (ref 135–145)
TROPONIN, HIGH SENSITIVITY: 17 NG/L (ref 0–12)
TROPONIN, HIGH SENSITIVITY: 18 NG/L (ref 0–12)
WBC # BLD AUTO: 5.4 THOU/MM3 (ref 4.8–10.8)

## 2025-08-11 PROCEDURE — 85730 THROMBOPLASTIN TIME PARTIAL: CPT

## 2025-08-11 PROCEDURE — 72170 X-RAY EXAM OF PELVIS: CPT

## 2025-08-11 PROCEDURE — 72125 CT NECK SPINE W/O DYE: CPT

## 2025-08-11 PROCEDURE — 84484 ASSAY OF TROPONIN QUANT: CPT

## 2025-08-11 PROCEDURE — 82248 BILIRUBIN DIRECT: CPT

## 2025-08-11 PROCEDURE — 2580000003 HC RX 258: Performed by: EMERGENCY MEDICINE

## 2025-08-11 PROCEDURE — 71275 CT ANGIOGRAPHY CHEST: CPT

## 2025-08-11 PROCEDURE — 74177 CT ABD & PELVIS W/CONTRAST: CPT

## 2025-08-11 PROCEDURE — G0378 HOSPITAL OBSERVATION PER HR: HCPCS

## 2025-08-11 PROCEDURE — 70450 CT HEAD/BRAIN W/O DYE: CPT

## 2025-08-11 PROCEDURE — 71045 X-RAY EXAM CHEST 1 VIEW: CPT

## 2025-08-11 PROCEDURE — 85025 COMPLETE CBC W/AUTO DIFF WBC: CPT

## 2025-08-11 PROCEDURE — APPSS30 APP SPLIT SHARED TIME 16-30 MINUTES: Performed by: NURSE PRACTITIONER

## 2025-08-11 PROCEDURE — 6820000001 HC L2 TRAUMA SURGERY EVALUATION: Performed by: SURGERY

## 2025-08-11 PROCEDURE — 80053 COMPREHEN METABOLIC PANEL: CPT

## 2025-08-11 PROCEDURE — 83880 ASSAY OF NATRIURETIC PEPTIDE: CPT

## 2025-08-11 PROCEDURE — 85610 PROTHROMBIN TIME: CPT

## 2025-08-11 PROCEDURE — 83605 ASSAY OF LACTIC ACID: CPT

## 2025-08-11 PROCEDURE — 99222 1ST HOSP IP/OBS MODERATE 55: CPT | Performed by: SURGERY

## 2025-08-11 PROCEDURE — 6360000004 HC RX CONTRAST MEDICATION: Performed by: EMERGENCY MEDICINE

## 2025-08-11 PROCEDURE — 93005 ELECTROCARDIOGRAM TRACING: CPT | Performed by: EMERGENCY MEDICINE

## 2025-08-11 PROCEDURE — 36415 COLL VENOUS BLD VENIPUNCTURE: CPT

## 2025-08-11 PROCEDURE — 82550 ASSAY OF CK (CPK): CPT

## 2025-08-11 PROCEDURE — 84146 ASSAY OF PROLACTIN: CPT

## 2025-08-11 PROCEDURE — 99285 EMERGENCY DEPT VISIT HI MDM: CPT

## 2025-08-11 RX ORDER — IOPAMIDOL 755 MG/ML
80 INJECTION, SOLUTION INTRAVASCULAR
Status: COMPLETED | OUTPATIENT
Start: 2025-08-11 | End: 2025-08-11

## 2025-08-11 RX ORDER — LIDOCAINE 4 G/G
1 PATCH TOPICAL ONCE
Status: DISCONTINUED | OUTPATIENT
Start: 2025-08-11 | End: 2025-08-12 | Stop reason: SDUPTHER

## 2025-08-11 RX ORDER — RIVASTIGMINE 4.6 MG/24H
1 PATCH, EXTENDED RELEASE TRANSDERMAL DAILY
COMMUNITY

## 2025-08-11 RX ORDER — 0.9 % SODIUM CHLORIDE 0.9 %
1000 INTRAVENOUS SOLUTION INTRAVENOUS ONCE
Status: COMPLETED | OUTPATIENT
Start: 2025-08-11 | End: 2025-08-11

## 2025-08-11 RX ORDER — MORPHINE SULFATE 2 MG/ML
2 INJECTION, SOLUTION INTRAMUSCULAR; INTRAVENOUS
Status: DISCONTINUED | OUTPATIENT
Start: 2025-08-11 | End: 2025-08-14 | Stop reason: HOSPADM

## 2025-08-11 RX ADMIN — SODIUM CHLORIDE 1000 ML: 9 INJECTION, SOLUTION INTRAVENOUS at 19:15

## 2025-08-11 RX ADMIN — IOPAMIDOL 80 ML: 755 INJECTION, SOLUTION INTRAVENOUS at 19:53

## 2025-08-11 ASSESSMENT — PAIN SCALES - GENERAL: PAINLEVEL_OUTOF10: 0

## 2025-08-12 ENCOUNTER — APPOINTMENT (OUTPATIENT)
Dept: GENERAL RADIOLOGY | Age: 75
End: 2025-08-12
Payer: COMMERCIAL
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
## 2025-08-12 PROBLEM — S22.42XA TRAUMATIC CLOSED DISPLACED FRACTURE OF MULTIPLE RIBS OF LEFT SIDE: Status: ACTIVE | Noted: 2025-08-12
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
## 2025-08-12 LAB
BILIRUB UR QL STRIP.AUTO: NEGATIVE
CHARACTER UR: CLEAR
COLOR, UA: YELLOW
EKG ATRIAL RATE: 61 BPM
EKG P AXIS: 178 DEGREES
EKG P-R INTERVAL: 166 MS
EKG Q-T INTERVAL: 464 MS
EKG QRS DURATION: 102 MS
EKG QTC CALCULATION (BAZETT): 467 MS
EKG R AXIS: -39 DEGREES
EKG T AXIS: 42 DEGREES
EKG VENTRICULAR RATE: 61 BPM
GLUCOSE UR QL STRIP.AUTO: NEGATIVE MG/DL
HGB UR QL STRIP.AUTO: NEGATIVE
KETONES UR QL STRIP.AUTO: NEGATIVE
NITRITE UR QL STRIP: NEGATIVE
PH UR STRIP.AUTO: 8.5 [PH] (ref 5–9)
PROT UR STRIP.AUTO-MCNC: NEGATIVE MG/DL
SP GR UR REFRACT.AUTO: > 1.03 (ref 1–1.03)
UROBILINOGEN, URINE: 1 EU/DL (ref 0–1)
WBC #/AREA URNS HPF: NEGATIVE /[HPF]

## 2025-08-12 PROCEDURE — 71045 X-RAY EXAM CHEST 1 VIEW: CPT

## 2025-08-12 PROCEDURE — 94799 UNLISTED PULMONARY SVC/PX: CPT

## 2025-08-12 PROCEDURE — 97165 OT EVAL LOW COMPLEX 30 MIN: CPT

## 2025-08-12 PROCEDURE — 94010 BREATHING CAPACITY TEST: CPT

## 2025-08-12 PROCEDURE — 6370000000 HC RX 637 (ALT 250 FOR IP)

## 2025-08-12 PROCEDURE — APPSS30 APP SPLIT SHARED TIME 16-30 MINUTES: Performed by: NURSE PRACTITIONER

## 2025-08-12 PROCEDURE — 6360000002 HC RX W HCPCS: Performed by: STUDENT IN AN ORGANIZED HEALTH CARE EDUCATION/TRAINING PROGRAM

## 2025-08-12 PROCEDURE — 99231 SBSQ HOSP IP/OBS SF/LOW 25: CPT | Performed by: SURGERY

## 2025-08-12 PROCEDURE — 2500000003 HC RX 250 WO HCPCS: Performed by: STUDENT IN AN ORGANIZED HEALTH CARE EDUCATION/TRAINING PROGRAM

## 2025-08-12 PROCEDURE — 1200000003 HC TELEMETRY R&B

## 2025-08-12 PROCEDURE — 93010 ELECTROCARDIOGRAM REPORT: CPT | Performed by: NUCLEAR MEDICINE

## 2025-08-12 PROCEDURE — 81003 URINALYSIS AUTO W/O SCOPE: CPT

## 2025-08-12 PROCEDURE — 6360000002 HC RX W HCPCS

## 2025-08-12 PROCEDURE — 97530 THERAPEUTIC ACTIVITIES: CPT

## 2025-08-12 PROCEDURE — 99221 1ST HOSP IP/OBS SF/LOW 40: CPT

## 2025-08-12 PROCEDURE — 6360000002 HC RX W HCPCS: Performed by: NURSE PRACTITIONER

## 2025-08-12 PROCEDURE — 2500000003 HC RX 250 WO HCPCS

## 2025-08-12 RX ORDER — OLANZAPINE 2.5 MG/1
2.5 TABLET, FILM COATED ORAL NIGHTLY
Status: DISCONTINUED | OUTPATIENT
Start: 2025-08-12 | End: 2025-08-14 | Stop reason: HOSPADM

## 2025-08-12 RX ORDER — ONDANSETRON 2 MG/ML
4 INJECTION INTRAMUSCULAR; INTRAVENOUS EVERY 6 HOURS PRN
Status: DISCONTINUED | OUTPATIENT
Start: 2025-08-12 | End: 2025-08-14 | Stop reason: HOSPADM

## 2025-08-12 RX ORDER — SENNA AND DOCUSATE SODIUM 50; 8.6 MG/1; MG/1
1 TABLET, FILM COATED ORAL 2 TIMES DAILY
Status: DISCONTINUED | OUTPATIENT
Start: 2025-08-12 | End: 2025-08-14 | Stop reason: HOSPADM

## 2025-08-12 RX ORDER — POLYETHYLENE GLYCOL 3350 17 G/17G
17 POWDER, FOR SOLUTION ORAL DAILY
Status: DISCONTINUED | OUTPATIENT
Start: 2025-08-12 | End: 2025-08-14 | Stop reason: HOSPADM

## 2025-08-12 RX ORDER — POTASSIUM CHLORIDE 29.8 MG/ML
20 INJECTION INTRAVENOUS PRN
Status: DISCONTINUED | OUTPATIENT
Start: 2025-08-12 | End: 2025-08-14 | Stop reason: HOSPADM

## 2025-08-12 RX ORDER — LIDOCAINE 4 G/G
1 PATCH TOPICAL DAILY
Status: DISCONTINUED | OUTPATIENT
Start: 2025-08-12 | End: 2025-08-14 | Stop reason: HOSPADM

## 2025-08-12 RX ORDER — DOCUSATE SODIUM 100 MG/1
100 CAPSULE, LIQUID FILLED ORAL 2 TIMES DAILY
Status: DISCONTINUED | OUTPATIENT
Start: 2025-08-12 | End: 2025-08-14 | Stop reason: HOSPADM

## 2025-08-12 RX ORDER — SODIUM CHLORIDE 0.9 % (FLUSH) 0.9 %
5-40 SYRINGE (ML) INJECTION EVERY 12 HOURS SCHEDULED
Status: DISCONTINUED | OUTPATIENT
Start: 2025-08-12 | End: 2025-08-14 | Stop reason: HOSPADM

## 2025-08-12 RX ORDER — ACETAMINOPHEN 500 MG
1000 TABLET ORAL EVERY 6 HOURS
Status: DISCONTINUED | OUTPATIENT
Start: 2025-08-12 | End: 2025-08-14 | Stop reason: HOSPADM

## 2025-08-12 RX ORDER — FUROSEMIDE 20 MG/1
20 TABLET ORAL
Status: DISCONTINUED | OUTPATIENT
Start: 2025-08-12 | End: 2025-08-14 | Stop reason: HOSPADM

## 2025-08-12 RX ORDER — MAGNESIUM SULFATE IN WATER 40 MG/ML
2000 INJECTION, SOLUTION INTRAVENOUS PRN
Status: DISCONTINUED | OUTPATIENT
Start: 2025-08-12 | End: 2025-08-14 | Stop reason: HOSPADM

## 2025-08-12 RX ORDER — TRAMADOL HYDROCHLORIDE 50 MG/1
25 TABLET ORAL EVERY 6 HOURS PRN
Status: DISCONTINUED | OUTPATIENT
Start: 2025-08-12 | End: 2025-08-14 | Stop reason: HOSPADM

## 2025-08-12 RX ORDER — ONDANSETRON 4 MG/1
4 TABLET, ORALLY DISINTEGRATING ORAL EVERY 8 HOURS PRN
Status: DISCONTINUED | OUTPATIENT
Start: 2025-08-12 | End: 2025-08-14 | Stop reason: HOSPADM

## 2025-08-12 RX ORDER — ENOXAPARIN SODIUM 100 MG/ML
40 INJECTION SUBCUTANEOUS DAILY
Status: DISCONTINUED | OUTPATIENT
Start: 2025-08-12 | End: 2025-08-14 | Stop reason: HOSPADM

## 2025-08-12 RX ORDER — ESCITALOPRAM OXALATE 10 MG/1
15 TABLET ORAL DAILY
Status: DISCONTINUED | OUTPATIENT
Start: 2025-08-12 | End: 2025-08-14 | Stop reason: HOSPADM

## 2025-08-12 RX ORDER — RIVASTIGMINE 4.6 MG/24H
1 PATCH, EXTENDED RELEASE TRANSDERMAL DAILY
Status: DISCONTINUED | OUTPATIENT
Start: 2025-08-12 | End: 2025-08-14 | Stop reason: HOSPADM

## 2025-08-12 RX ORDER — SODIUM CHLORIDE 9 MG/ML
INJECTION, SOLUTION INTRAVENOUS PRN
Status: DISCONTINUED | OUTPATIENT
Start: 2025-08-12 | End: 2025-08-14 | Stop reason: HOSPADM

## 2025-08-12 RX ORDER — SODIUM CHLORIDE 0.9 % (FLUSH) 0.9 %
5-40 SYRINGE (ML) INJECTION PRN
Status: DISCONTINUED | OUTPATIENT
Start: 2025-08-12 | End: 2025-08-14 | Stop reason: HOSPADM

## 2025-08-12 RX ORDER — MEMANTINE HYDROCHLORIDE 10 MG/1
10 TABLET ORAL 2 TIMES DAILY
Status: DISCONTINUED | OUTPATIENT
Start: 2025-08-12 | End: 2025-08-14 | Stop reason: HOSPADM

## 2025-08-12 RX ORDER — POTASSIUM CHLORIDE 7.45 MG/ML
10 INJECTION INTRAVENOUS PRN
Status: DISCONTINUED | OUTPATIENT
Start: 2025-08-12 | End: 2025-08-14 | Stop reason: HOSPADM

## 2025-08-12 RX ADMIN — FUROSEMIDE 20 MG: 20 TABLET ORAL at 09:50

## 2025-08-12 RX ADMIN — ZIPRASIDONE MESYLATE 20 MG: 20 INJECTION, POWDER, LYOPHILIZED, FOR SOLUTION INTRAMUSCULAR at 06:03

## 2025-08-12 RX ADMIN — MEMANTINE 10 MG: 10 TABLET ORAL at 09:50

## 2025-08-12 RX ADMIN — ENOXAPARIN SODIUM 40 MG: 100 INJECTION SUBCUTANEOUS at 09:50

## 2025-08-12 RX ADMIN — ZIPRASIDONE MESYLATE 10 MG: 20 INJECTION, POWDER, LYOPHILIZED, FOR SOLUTION INTRAMUSCULAR at 18:18

## 2025-08-12 ASSESSMENT — PAIN SCALES - WONG BAKER: WONGBAKER_NUMERICALRESPONSE: NO HURT

## 2025-08-12 ASSESSMENT — ENCOUNTER SYMPTOMS
VOMITING: 0
ALLERGIC/IMMUNOLOGIC NEGATIVE: 1
SHORTNESS OF BREATH: 1
GASTROINTESTINAL NEGATIVE: 1
ABDOMINAL PAIN: 0
BACK PAIN: 1
PHOTOPHOBIA: 0
COUGH: 0
RESPIRATORY NEGATIVE: 1
EYES NEGATIVE: 1
NAUSEA: 0

## 2025-08-12 ASSESSMENT — PAIN SCALES - GENERAL
PAINLEVEL_OUTOF10: 0
PAINLEVEL_OUTOF10: 0

## 2025-08-13 PROCEDURE — 1200000000 HC SEMI PRIVATE

## 2025-08-13 PROCEDURE — 94010 BREATHING CAPACITY TEST: CPT

## 2025-08-13 PROCEDURE — 99231 SBSQ HOSP IP/OBS SF/LOW 25: CPT | Performed by: STUDENT IN AN ORGANIZED HEALTH CARE EDUCATION/TRAINING PROGRAM

## 2025-08-13 PROCEDURE — 6360000002 HC RX W HCPCS: Performed by: NURSE PRACTITIONER

## 2025-08-13 PROCEDURE — 6370000000 HC RX 637 (ALT 250 FOR IP)

## 2025-08-13 PROCEDURE — 6370000000 HC RX 637 (ALT 250 FOR IP): Performed by: NURSE PRACTITIONER

## 2025-08-13 PROCEDURE — APPSS30 APP SPLIT SHARED TIME 16-30 MINUTES: Performed by: NURSE PRACTITIONER

## 2025-08-13 PROCEDURE — 94799 UNLISTED PULMONARY SVC/PX: CPT

## 2025-08-13 PROCEDURE — 97161 PT EVAL LOW COMPLEX 20 MIN: CPT

## 2025-08-13 PROCEDURE — 99231 SBSQ HOSP IP/OBS SF/LOW 25: CPT | Performed by: SURGERY

## 2025-08-13 RX ORDER — DROPERIDOL 2.5 MG/ML
1.25 INJECTION, SOLUTION INTRAMUSCULAR; INTRAVENOUS
Status: DISCONTINUED | OUTPATIENT
Start: 2025-08-13 | End: 2025-08-14 | Stop reason: HOSPADM

## 2025-08-13 RX ORDER — DIPHENHYDRAMINE HYDROCHLORIDE 50 MG/ML
25 INJECTION, SOLUTION INTRAMUSCULAR; INTRAVENOUS
Status: DISCONTINUED | OUTPATIENT
Start: 2025-08-13 | End: 2025-08-14 | Stop reason: HOSPADM

## 2025-08-13 RX ADMIN — OLANZAPINE 2.5 MG: 2.5 TABLET, FILM COATED ORAL at 21:03

## 2025-08-13 RX ADMIN — Medication 3 MG: at 21:03

## 2025-08-13 RX ADMIN — MEMANTINE 10 MG: 10 TABLET ORAL at 21:03

## 2025-08-13 RX ADMIN — ENOXAPARIN SODIUM 40 MG: 100 INJECTION SUBCUTANEOUS at 08:18

## 2025-08-13 RX ADMIN — ACETAMINOPHEN 1000 MG: 500 TABLET ORAL at 14:48

## 2025-08-13 ASSESSMENT — PAIN SCALES - GENERAL
PAINLEVEL_OUTOF10: 0
PAINLEVEL_OUTOF10: 0

## 2025-08-13 ASSESSMENT — PAIN - FUNCTIONAL ASSESSMENT: PAIN_FUNCTIONAL_ASSESSMENT: 0-10

## 2025-08-14 VITALS
RESPIRATION RATE: 16 BRPM | OXYGEN SATURATION: 99 % | HEIGHT: 68 IN | HEART RATE: 67 BPM | WEIGHT: 152.34 LBS | TEMPERATURE: 97.5 F | DIASTOLIC BLOOD PRESSURE: 62 MMHG | SYSTOLIC BLOOD PRESSURE: 121 MMHG | BODY MASS INDEX: 23.09 KG/M2

## 2025-08-14 LAB
ANION GAP SERPL CALC-SCNC: 10 MEQ/L (ref 8–16)
BUN SERPL-MCNC: 22 MG/DL (ref 8–23)
CALCIUM SERPL-MCNC: 9.4 MG/DL (ref 8.5–10.5)
CHLORIDE SERPL-SCNC: 103 MEQ/L (ref 98–111)
CO2 SERPL-SCNC: 26 MEQ/L (ref 22–29)
CREAT SERPL-MCNC: 1 MG/DL (ref 0.7–1.2)
GFR SERPL CREATININE-BSD FRML MDRD: 78 ML/MIN/1.73M2
GLUCOSE SERPL-MCNC: 88 MG/DL (ref 74–109)
POTASSIUM SERPL-SCNC: 5.1 MEQ/L (ref 3.5–5.2)
SODIUM SERPL-SCNC: 139 MEQ/L (ref 135–145)

## 2025-08-14 PROCEDURE — 80048 BASIC METABOLIC PNL TOTAL CA: CPT

## 2025-08-14 PROCEDURE — 36415 COLL VENOUS BLD VENIPUNCTURE: CPT

## 2025-08-14 PROCEDURE — APPNB30 APP NON BILLABLE TIME 0-30 MINS

## 2025-08-14 PROCEDURE — 99231 SBSQ HOSP IP/OBS SF/LOW 25: CPT | Performed by: SURGERY

## 2025-08-14 RX ORDER — FUROSEMIDE 20 MG/1
20 TABLET ORAL
Qty: 60 TABLET | Refills: 3 | Status: SHIPPED | OUTPATIENT
Start: 2025-08-16

## 2025-08-14 ASSESSMENT — PAIN SCALES - GENERAL
PAINLEVEL_OUTOF10: 0
PAINLEVEL_OUTOF10: 0